# Patient Record
Sex: FEMALE | Race: WHITE | NOT HISPANIC OR LATINO
[De-identification: names, ages, dates, MRNs, and addresses within clinical notes are randomized per-mention and may not be internally consistent; named-entity substitution may affect disease eponyms.]

---

## 2019-07-29 PROBLEM — Z00.00 ENCOUNTER FOR PREVENTIVE HEALTH EXAMINATION: Status: ACTIVE | Noted: 2019-07-29

## 2019-08-05 ENCOUNTER — APPOINTMENT (OUTPATIENT)
Dept: ORTHOPEDIC SURGERY | Facility: CLINIC | Age: 44
End: 2019-08-05

## 2019-08-06 ENCOUNTER — TRANSFERRED RECORDS (OUTPATIENT)
Dept: HEALTH INFORMATION MANAGEMENT | Facility: CLINIC | Age: 44
End: 2019-08-06

## 2019-08-08 ENCOUNTER — APPOINTMENT (OUTPATIENT)
Dept: ORTHOPEDIC SURGERY | Facility: CLINIC | Age: 44
End: 2019-08-08
Payer: COMMERCIAL

## 2019-08-08 VITALS — WEIGHT: 129 LBS | HEIGHT: 68 IN | BODY MASS INDEX: 19.55 KG/M2

## 2019-08-08 DIAGNOSIS — Z82.49 FAMILY HISTORY OF ISCHEMIC HEART DISEASE AND OTHER DISEASES OF THE CIRCULATORY SYSTEM: ICD-10-CM

## 2019-08-08 DIAGNOSIS — Z87.09 PERSONAL HISTORY OF OTHER DISEASES OF THE RESPIRATORY SYSTEM: ICD-10-CM

## 2019-08-08 DIAGNOSIS — S73.191A OTHER SPRAIN OF RIGHT HIP, INITIAL ENCOUNTER: ICD-10-CM

## 2019-08-08 DIAGNOSIS — M76.821 POSTERIOR TIBIAL TENDINITIS, RIGHT LEG: ICD-10-CM

## 2019-08-08 DIAGNOSIS — Z85.9 PERSONAL HISTORY OF MALIGNANT NEOPLASM, UNSPECIFIED: ICD-10-CM

## 2019-08-08 PROCEDURE — 99204 OFFICE O/P NEW MOD 45 MIN: CPT

## 2020-06-04 ENCOUNTER — TRANSFERRED RECORDS (OUTPATIENT)
Dept: HEALTH INFORMATION MANAGEMENT | Facility: CLINIC | Age: 45
End: 2020-06-04

## 2020-06-07 ENCOUNTER — TRANSFERRED RECORDS (OUTPATIENT)
Dept: HEALTH INFORMATION MANAGEMENT | Facility: CLINIC | Age: 45
End: 2020-06-07

## 2020-06-09 ENCOUNTER — TRANSFERRED RECORDS (OUTPATIENT)
Dept: HEALTH INFORMATION MANAGEMENT | Facility: CLINIC | Age: 45
End: 2020-06-09

## 2020-11-19 ENCOUNTER — TRANSCRIBE ORDERS (OUTPATIENT)
Dept: OTHER | Age: 45
End: 2020-11-19

## 2020-11-19 DIAGNOSIS — C53.9 CERVICAL CANCER (H): Primary | ICD-10-CM

## 2020-11-19 DIAGNOSIS — Z90.711 HISTORY OF PARTIAL HYSTERECTOMY: ICD-10-CM

## 2020-11-24 NOTE — TELEPHONE ENCOUNTER
Action    Action Taken 11/24/20: LVM for pt re: recs call.  1:39 PM    -12/14/20: Spoke w. Pt - 2010, Pt was diagnosed @ [facility]. Provided me w/ verbal auth. Pt advised has been w/ [facility] since beginning. Pt could not recall when they may have had imaging done, mentioned no PETs specifically. Pt advised me all recs @ [facility], gave verbal permission to view recs in CE.     [facility]: [id number]    -12/14/20: Spoke w/ [facility] Medical Records - they will not release us additional information until pt is in facility, and would not provide me with a fax number. I will CB Friday, and state pt is in facility.  12:29 PM    -12/15/20: Rec'd call from Francine @ [facility] Path - they need signed MIKE for Path/Reports & advised me any studies older than 2018 would be stored off site & take longer to retrieve.     -Roxana advised me that she printed up all path reports & is ready to send them once MIKE is rec'd    -Spoke w/ Pt - emailed MIKE to pt.  11:32 AM    12/17/20: MIKE rec'd - sent to HIM for urgent upload, re-faxed shipping label, request & included MIKE to [facility] Pathology Department.    12/18/20: Path Reports rec'd - faxed to HIM for urgent upload  8:00 AM           RECORDS STATUS - ALL OTHER DIAGNOSIS      RECORDS RECEIVED FROM: [facility]   DATE RECEIVED:    NOTES STATUS DETAILS   OFFICE NOTE from referring provider Self, notes in CE/[facility]    OFFICE NOTE from medical oncologist [facility]/ - [facility] Dr. Ruben Painting: 6/4/20   DISCHARGE SUMMARY from hospital     DISCHARGE REPORT from the ER     OPERATIVE REPORT  - [facility] 11/18/16: Modified laparoscopic radical hysterectomy, bilateral salpingectomy, bilateral ureterolysis, bilateral sentinel lymph node biopsies.       10/2016: Examination under anesthesia, Pap smear, endocervical curettage procedure.    5/8/15: Examination under anesthesia/Endocervical curettage    9/12/14  7/18/2014  10/2009  5/8/2009   MEDICATION LIST     CLINICAL  TRIAL TREATMENTS TO DATE     LABS     PATHOLOGY REPORTS     ANYTHING RELATED TO DIAGNOSIS AMADA Mariano 11/5/19: PAP   GENONOMIC TESTING     TYPE:     IMAGING (NEED IMAGES & REPORT)     CT SCANS     MRI     MAMMO     ULTRASOUND     PET

## 2020-12-21 ENCOUNTER — ONCOLOGY VISIT (OUTPATIENT)
Dept: ONCOLOGY | Facility: CLINIC | Age: 45
End: 2020-12-21
Attending: OBSTETRICS & GYNECOLOGY
Payer: COMMERCIAL

## 2020-12-21 ENCOUNTER — PRE VISIT (OUTPATIENT)
Dept: ONCOLOGY | Facility: CLINIC | Age: 45
End: 2020-12-21

## 2020-12-21 VITALS
TEMPERATURE: 97.8 F | OXYGEN SATURATION: 99 % | HEART RATE: 76 BPM | HEIGHT: 67 IN | WEIGHT: 126 LBS | BODY MASS INDEX: 19.78 KG/M2 | SYSTOLIC BLOOD PRESSURE: 100 MMHG | DIASTOLIC BLOOD PRESSURE: 66 MMHG

## 2020-12-21 DIAGNOSIS — C53.9 CERVICAL CANCER (H): Primary | ICD-10-CM

## 2020-12-21 PROCEDURE — 88175 CYTOPATH C/V AUTO FLUID REDO: CPT | Mod: TC | Performed by: OBSTETRICS & GYNECOLOGY

## 2020-12-21 PROCEDURE — G0463 HOSPITAL OUTPT CLINIC VISIT: HCPCS

## 2020-12-21 PROCEDURE — 99204 OFFICE O/P NEW MOD 45 MIN: CPT | Performed by: OBSTETRICS & GYNECOLOGY

## 2020-12-21 PROCEDURE — 87624 HPV HI-RISK TYP POOLED RSLT: CPT | Performed by: OBSTETRICS & GYNECOLOGY

## 2020-12-21 ASSESSMENT — PAIN SCALES - GENERAL: PAINLEVEL: NO PAIN (0)

## 2020-12-21 ASSESSMENT — MIFFLIN-ST. JEOR: SCORE: 1254.28

## 2020-12-21 NOTE — LETTER
Date:December 22, 2020      Patient was self referred, no letter generated. Do not send.        [facility] Health Information

## 2020-12-21 NOTE — PROGRESS NOTES
Consult Notes on Referred Patient    Date: 2020       Dr. Malena Bell MD  No address on file       RE: Jaelyn Jeffries  : 1975  NIKOLAS: 2020    Dear Dr. Referred Self:    I had the pleasure of seeing your patient Jaelyn Jeffries here at the Gynecologic Cancer Clinic at the [facility] on 2020.  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 Given these findings she was subsequently sent to the Gynecologic Cancer Clinic for new patient consultation to establish care.       Today: Just moved from [facility], has one year old boy-Tobias born from [facility] [place] [place]. Living in [place]. Working remotely here from [facility]. Was getting every 6 mos pap smears in [facility]. Here today for repeat pap. Feeling well overall except has a nodule in right axilla she would like me to look at.    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    2018: PAP WNL:  SPECIMEN ADEQUACY:   Satisfactory for evaluation.     DIAGNOSTIC INTERPRETATION:   Negative for intraepithelial lesions and malignancy     PRIOR PAP SMEAR DIAGNOSES:   DATE ACCESSION #    DIAGNOSIS   17  [id number]    Negative   10/17/16  [id number]    LGSIL   16  [facility] [id number]    Negative   10/07/15  [facility] [place]15-08707    JATINDER   04/15/15  -[id number]    Negative   01/07/15  -[id number]    Negative   14  [facility] [place]14-71605    ASCUS sugg, r/o KEITH   14  [id number]    Carcinoma   10/31/13  -[id number]    Negative   13  -[id number]    Negative   10/24/12  [facility] [id number]    JATINDER   12  [facility] [place]12-31843    JATINDER   12  [facility] [place]12-97757    Negative   11   -[id number]    Negative   02/02/11  [facility]11-11988    ASCUS   09/08/10  [id number]    Negative   05/20/10  [id number]    Negative   04/14/10  [id number]    JATINDER   01/14/10  [id number]    Negative   09/09/09  [id number]    Negative     Patient presents today c/o right Bartholin abscess     11/07/2018: S/p Bartholyn cyst marcupalization  DIAGNOSIS:  Bartholin's gland cyst wall, excision:  - Bartholin's gland cyst wall with severe acute inflammation and  reactive changes.    1/24/2019: PAP unsatisfactory for evaluation     2/12/2019: S/p rpt PAP WNL/HRHPV pos:  SPECIMEN ADEQUACY:   Satisfactory for evaluation.     DIAGNOSTIC INTERPRETATION:   Negative for intraepithelial lesions and malignancy     HPV RESULTS:   Positive for HPV, other High Risk Genotype   (31/33/35/39/45/51/52/56/58/59/66/68).   Negative for HPV 16/18 Genotypes     8/1/2019: S/p PAP showing ASCUS/HRHPV pos (HPV 16/18 neg):  DIAGNOSIS: CommentAbnormal Final [facility] 01   EPITHELIAL CELL ABNORMALITY.   ATYPICAL SQUAMOUS CELLS OF UNDETERMINED SIGNIFICANCE (ASC-US)   (VAGINAL).     HPV OTHER HR TYPES PositiveAbnormal Negative Final [facility] 02   HPV 16 Negative Negative Final [facility] 02   HPV 18 Negative Negative Final [facility] 02     8/06/2019: S/p colposcopy with vaginal cuff biopsy WNL:  DIAGNOSIS:     VAGINAL CUFF BIOPSY  -  Parakeratosis and occasional cells   suggestive of koilocytes.     11/05/2019: PAP WNL/HPV HR neg    6/9/2020: pap-NIL/HPV HR negative    Review of Systems:    Systemic           no weight changes; no fever; no chills; no night sweats; no appetite changes  Skin           no rashes, or lesions  Eye           no irritation; no changes in vision  Lila-Laryngeal           no dysphagia; no hoarseness   Pulmonary    no cough; no shortness of breath  Cardiovascular    no chest pain; no palpitations  Gastrointestinal    no diarrhea; no constipation; no abdominal pain; no changes in bowel  habits; no blood in stool  Genitourinary   no  urinary frequency; no urinary urgency; no dysuria; no pain; no abnormal vaginal discharge; no abnormal vaginal bleeding  Breast   no breast discharge; no breast changes; no breast pain  Musculoskeletal    no myalgias; no arthralgias; no back pain  Psychiatric           no depressed mood; no anxiety    Hematologic           no tender lymph nodes; no noticeable swellings or lumps   Endocrine    no hot flashes; no heat/cold intolerance         Neurological   no tremor; no numbness and tingling; no headaches; no difficulty  sleeping      Past Medical History:     Carcinoma in situ of endocervix     Fungal infection     Constipation, chronic     Fibrocystic breast changes     Abnormal Pap smear     Depression     Anxiety     Neck pain on right side     Pain of right scapula     Labral tear of shoulder     Cervicalgia     Adenocarcinoma in situ (AIS) of uterine cervix     Overactive bladder     Cyst of Bartholin's gland     Bartholin gland cyst   Labral tear of hip, degenerative     Laceration of hip, right 2012   labial tear       Past Surgical History:     CONIZATION CERVIX,KNIFE/LASER 5/2010, 10/2010   x2     HX EXTERNAL EAR SURGERY age 5     HX NASAL SEPTUM SURGERY 2007   with rhinoplasty     ORAL SURGERY PROCEDURE     TOTAL HYSTERECTOMY 11/18/16   radical, salpingectomy B/L, Bartholin cyst marsupalization       Health Maintenance:  Health Maintenance Due   Topic Date Due     PREVENTIVE CARE VISIT  1975     HIV SCREENING  07/25/1990     HEPATITIS C SCREENING  07/25/1993     DTAP/TDAP/TD IMMUNIZATION (1 - Tdap) 07/25/2000     PAP  01/09/2018     PHQ-2  01/01/2020     LIPID  07/25/2020       Last Pap Smear: 6/9/2020             Result: normal neg HR HPV  She has had a history of abnormal Pap smears.    Last Mammogram:  due 1/2021             Result: normal      She has not had a history of abnormal mammograms.    Last Colonoscopy: None              Result: not done                Patient has 1 year old child born  "via surrogate    Current Medications:     currently has no medications in their medication list.       Allergies:     No Known Allergies         Social History:     Social History     Tobacco Use     Smoking status: Not on file   Substance Use Topics     Alcohol use: Not on file       History   Drug Use Not on file           Family History:     The patient's family history is notable for the following:    Father's mother with ovarian cancer-  Patient was tested for BRCA negative at [facility]      Physical Exam:     /66   Pulse 76   Temp 97.8  F (36.6  C) (Tympanic)   Ht 1.71 m (5' 7.32\")   Wt 57.2 kg (126 lb)   SpO2 99%   BMI 19.55 kg/m    Body mass index is 19.55 kg/m .    General Appearance: healthy and alert, no distress     HEENT:  no thyromegaly, no palpable nodules or masses        Cardiovascular: regular rate and rhythm, no gallops, rubs or murmurs     Respiratory: lungs clear, no rales, rhonchi or wheezes, normal diaphragmatic excursion    Musculoskeletal: extremities non tender and without edema    Skin: Right axilla, raised firm bump. With pressure I am able to express whitish discharge from this until area is decompressed.     Neurological: normal gait, no gross defects     Psychiatric: appropriate mood and affect                               Hematological: normal cervical, supraclavicular and inguinal lymph nodes     Gastrointestinal:       abdomen soft, non-tender, non-distended, no organomegaly or masses    Genitourinary: External genitalia and urethral meatus appears normal.  Vagina is smooth without nodularity or masses.  Cervix absent, mild scar at apex.  Bimanual exam reveal no masses, nodularity or fullness.  Recto-vaginal exam confirms these findings. Pap done of vaginal apex.      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  S/p Bartholin cyst marsupulization   Right axillary sebaceous cyst        Plan:     1.)    Pap done today. 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 Plan return in 6 mos for exam with NP, repeat pap in 1 year if normal today. Following this can do yearly visits as will be out 5 years in 11/2021.     2.) Genetic risk factors were assessed - patient was tested and negative for BRCA due to [employer] heritage.    3.) Labs and/or tests ordered include: pap smear     4.) Health maintenance issues addressed today include: patient to have MMG in Jan 2021-already scheduled.    5.) Right axillary sebaceous cyst-recommend heat to area.           Thank you for allowing us to participate in the care of your patient.         Sincerely,    Kenna Roblero MD    Department of Ob/Gyn and Women's Health  Division of Gynecologic Oncology  [facility]  [phone]          CC  No care team member to display  SELF, REFERRED

## 2020-12-21 NOTE — NURSING NOTE
Return appt 6 months with NP   Return 1 year with regular gyn  Pap smear done today, orders entered, specimen sent to cytology

## 2020-12-21 NOTE — PATIENT INSTRUCTIONS
Follow up appointment in 6 months, scheduling will call you to help make an appointment  referral to gynecology(Mark Rosenthal, Kee), scheduling will call you to help make an appointment  Pap smear done today, you will be notified via my chart/telephone with test results

## 2020-12-24 LAB
COPATH REPORT: NORMAL
PAP: NORMAL

## 2020-12-28 LAB
FINAL DIAGNOSIS: NORMAL
HPV HR 12 DNA CVX QL NAA+PROBE: NEGATIVE
HPV16 DNA SPEC QL NAA+PROBE: NEGATIVE
HPV18 DNA SPEC QL NAA+PROBE: NEGATIVE
SPECIMEN DESCRIPTION: NORMAL
SPECIMEN SOURCE CVX/VAG CYTO: NORMAL

## 2021-01-06 ENCOUNTER — DOCUMENTATION ONLY (OUTPATIENT)
Dept: ONCOLOGY | Facility: CLINIC | Age: 46
End: 2021-01-06

## 2021-01-06 NOTE — PROGRESS NOTES
Slides from [facility] rec'd - sent to 5th floor lab @ [facility]  3:32 PM

## 2021-01-08 PROCEDURE — 999N001032 HC STATISTIC REVIEW OUTSIDE SLIDES TC 88321: Performed by: OBSTETRICS & GYNECOLOGY

## 2021-01-08 PROCEDURE — 88321 CONSLTJ&REPRT SLD PREP ELSWR: CPT | Mod: GC | Performed by: PATHOLOGY

## 2021-01-13 LAB — COPATH REPORT: NORMAL

## 2021-01-15 ENCOUNTER — HEALTH MAINTENANCE LETTER (OUTPATIENT)
Age: 46
End: 2021-01-15

## 2021-01-27 ENCOUNTER — PATIENT OUTREACH (OUTPATIENT)
Dept: ONCOLOGY | Facility: CLINIC | Age: 46
End: 2021-01-27

## 2021-01-28 NOTE — PROGRESS NOTES
Pt left message on voice mail  Looking for pap test results and wonders if should get covid vaccine

## 2021-01-28 NOTE — PROGRESS NOTES
Pt informed on test results  Wonders if she should get covid test  Discussed parameters of the need for covid testing

## 2021-03-07 ENCOUNTER — HEALTH MAINTENANCE LETTER (OUTPATIENT)
Age: 46
End: 2021-03-07

## 2021-06-08 PROBLEM — C53.9 CERVICAL ADENOCARCINOMA (H): Status: ACTIVE | Noted: 2021-06-08

## 2021-06-08 ASSESSMENT — ENCOUNTER SYMPTOMS
ORTHOPNEA: 0
LOSS OF CONSCIOUSNESS: 0
EYE WATERING: 0
LEG SWELLING: 0
CHILLS: 0
SINUS PAIN: 0
BREAST PAIN: 0
NAUSEA: 0
SNORES LOUDLY: 0
EYE REDNESS: 0
HALLUCINATIONS: 0
DECREASED APPETITE: 0
BLOOD IN STOOL: 0
COUGH DISTURBING SLEEP: 0
RECTAL PAIN: 0
MUSCLE CRAMPS: 0
EYE PAIN: 0
DECREASED LIBIDO: 0
WEIGHT LOSS: 0
HOARSE VOICE: 0
SLEEP DISTURBANCES DUE TO BREATHING: 0
WEIGHT GAIN: 0
SKIN CHANGES: 0
CONSTIPATION: 0
HEADACHES: 0
DIZZINESS: 0
MUSCLE WEAKNESS: 0
SYNCOPE: 0
CLAUDICATION: 0
BRUISES/BLEEDS EASILY: 0
ARTHRALGIAS: 0
SMELL DISTURBANCE: 0
ABDOMINAL PAIN: 0
HEMATURIA: 0
HYPERTENSION: 0
FLANK PAIN: 0
NERVOUS/ANXIOUS: 0
LEG PAIN: 0
NECK MASS: 0
HEMOPTYSIS: 0
FEVER: 0
SWOLLEN GLANDS: 0
DECREASED CONCENTRATION: 0
BOWEL INCONTINENCE: 0
NUMBNESS: 0
VOMITING: 0
POLYPHAGIA: 0
LIGHT-HEADEDNESS: 0
HYPOTENSION: 0
TACHYCARDIA: 0
MEMORY LOSS: 0
POLYDIPSIA: 0
WHEEZING: 0
DISTURBANCES IN COORDINATION: 0
EYE IRRITATION: 0
NECK PAIN: 0
JAUNDICE: 0
RESPIRATORY PAIN: 0
DIARRHEA: 0
SEIZURES: 0
DYSPNEA ON EXERTION: 0
NIGHT SWEATS: 0
DEPRESSION: 0
ALTERED TEMPERATURE REGULATION: 0
SPUTUM PRODUCTION: 0
PARALYSIS: 0
FATIGUE: 0
JOINT SWELLING: 0
SPEECH CHANGE: 0
WEAKNESS: 0
HEARTBURN: 0
TASTE DISTURBANCE: 0
STIFFNESS: 0
HOT FLASHES: 0
BACK PAIN: 0
COUGH: 0
TINGLING: 0
PANIC: 0
SHORTNESS OF BREATH: 0
RECTAL BLEEDING: 0
TREMORS: 0
DYSURIA: 0
INSOMNIA: 0
PALPITATIONS: 0
DOUBLE VISION: 0
INCREASED ENERGY: 0
SINUS CONGESTION: 0
SORE THROAT: 0
DIFFICULTY URINATING: 0
TROUBLE SWALLOWING: 0
POOR WOUND HEALING: 0
EXTREMITY NUMBNESS: 0
MYALGIAS: 0
BREAST MASS: 0
EXERCISE INTOLERANCE: 0
BLOATING: 0
NAIL CHANGES: 0
POSTURAL DYSPNEA: 0

## 2021-06-08 NOTE — PROGRESS NOTES
Gynecologic Oncology Follow Up Note    Date: 2021    RE: Jaelyn Jeffries  : 1975  NIKOLAS: 2021    CC: Stage IA2 adenocarcinoma of the cervix    HPI:  Jaelyn Jeffries is a 45 year old woman with a history of stage IA2 adenocarcinoma of the cervix.  She is s/p modified radical hysterectomy with sentinel lymph node dissection 2016, which served as her treatment.  She is here today for a surveillance visit.     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    2018: PAP WNL:  SPECIMEN ADEQUACY:   Satisfactory for evaluation.     DIAGNOSTIC INTERPRETATION:   Negative for intraepithelial lesions and malignancy     PRIOR PAP SMEAR DIAGNOSES:   DATE ACCESSION #    DIAGNOSIS   17  [id number]    Negative   10/17/16  [id number]    LGSIL   16  -[id number]    Negative   10/07/15  -[id number]    JATINDER   04/15/15  -[id number]    Negative   01/07/15  -[id number]    Negative   14  -[id number]    ASCUS sugg, r/o KEITH   14  -[id number]    Carcinoma   10/31/13  -[id number]    Negative   13  -[id number]    Negative   10/24/12  -[id number]    JATINDER   12  -[id number]    JATINDER   12  -[id number]    Negative   11  -[id number]    Negative   11  -[id number]    ASCUS   09/08/10  -[id number]    Negative   05/20/10  -[id number]    Negative   04/14/10  -[id number]    JATINDER   01/14/10  -[id number]    Negative   09  -[id number]    Negative     Patient presents today c/o right Bartholin abscess     2018: S/p Bartholyn cyst marcupalization  DIAGNOSIS:  Bartholin's gland cyst wall, excision:  - Bartholin's gland cyst wall with severe acute inflammation and  reactive  changes.    1/24/2019: PAP unsatisfactory for evaluation     2/12/2019: S/p rpt PAP WNL/HRHPV pos:  SPECIMEN ADEQUACY:   Satisfactory for evaluation.     DIAGNOSTIC INTERPRETATION:   Negative for intraepithelial lesions and malignancy     HPV RESULTS:   Positive for HPV, other High Risk Genotype   (31/33/35/39/45/51/52/56/58/59/66/68).   Negative for HPV 16/18 Genotypes     8/1/2019: S/p PAP showing ASCUS/HRHPV pos (HPV 16/18 neg):  DIAGNOSIS: CommentAbnormal Final [facility] 01   EPITHELIAL CELL ABNORMALITY.   ATYPICAL SQUAMOUS CELLS OF UNDETERMINED SIGNIFICANCE (ASC-US)   (VAGINAL).     HPV OTHER HR TYPES PositiveAbnormal Negative Final [facility] 02   HPV 16 Negative Negative Final [facility] 02   HPV 18 Negative Negative Final [facility] 02     8/06/2019: S/p colposcopy with vaginal cuff biopsy WNL:  DIAGNOSIS:     VAGINAL CUFF BIOPSY  -  Parakeratosis and occasional cells   suggestive of koilocytes.     11/05/2019: PAP WNL/HPV HR neg  6/9/2020: pap-NIL/HPV HR negative  12/21/2020: Pap NIL.  HPV neg                  Today she reports feeling well and is without concern.  She denies any vaginal bleeding, no changes in her bowel or bladder habits, no nausea/emesis, no lower extremity edema, and no difficulties eating or sleeping. She denies any abdominal discomfort/bloating, no fevers or chills, and no chest pain or shortness of breath. She is current with her annual physical, mammogram, and she is fully vaccinated against COVID.                    Health Maintenance  Mammogram: 1/25/21  Annual physical: 2/15/21  COVID vaccine: 3/15/21, 4/9/21      Review of Systems:    Review of Systems     Constitutional:  Negative for fever, chills, weight loss, weight gain, fatigue, decreased appetite, night sweats, recent stressors, height gain, height loss, post-operative complications, incisional pain, hallucinations, increased energy, hyperactivity and confused.   HENT:  Negative for ear pain, hearing loss, tinnitus, nosebleeds,  trouble swallowing, hoarse voice, mouth sores, sore throat, ear discharge, tooth pain, gum tenderness, taste disturbance, smell disturbance, hearing aid, bleeding gums, dry mouth, sinus pain, sinus congestion and neck mass.    Eyes:  Negative for double vision, pain, redness, eye pain, decreased vision, eye watering, eye bulging, eye dryness, flashing lights, spots, floaters, strabismus, tunnel vision, jaundice and eye irritation.   Respiratory:   Negative for cough, hemoptysis, sputum production, shortness of breath, wheezing, sleep disturbances due to breathing, snores loudly, respiratory pain, dyspnea on exertion, cough disturbing sleep and postural dyspnea.    Cardiovascular:  Negative for chest pain, dyspnea on exertion, palpitations, orthopnea, claudication, leg swelling, fingers/toes turn blue, hypertension, hypotension, syncope, history of heart murmur, chest pain on exertion, chest pain at rest, pacemaker, few scattered varicosities, leg pain, sleep disturbances due to breathing, tachycardia, light-headedness, exercise intolerance and edema.   Gastrointestinal:  Negative for heartburn, nausea, vomiting, abdominal pain, diarrhea, constipation, blood in stool, melena, rectal pain, bloating, hemorrhoids, bowel incontinence, jaundice, rectal bleeding, coffee ground emesis and change in stool.   Genitourinary:  Negative for bladder incontinence, dysuria, urgency, hematuria, flank pain, vaginal discharge, difficulty urinating, genital sores, dyspareunia, decreased libido, nocturia, voiding less frequently, arousal difficulty, abnormal vaginal bleeding, excessive menstruation, menstrual changes, hot flashes, vaginal dryness and postmenopausal bleeding.   Musculoskeletal:  Negative for myalgias, back pain, joint swelling, arthralgias, stiffness, muscle cramps, neck pain, bone pain, muscle weakness and fracture.   Skin:  Negative for nail changes, itching, poor wound healing, rash, hair changes, skin changes, acne,  warts, poor wound healing, scarring, flaky skin, Raynaud's phenomenon, sensitivity to sunlight and skin thickening.   Neurological:  Negative for dizziness, tingling, tremors, speech change, seizures, loss of consciousness, weakness, light-headedness, numbness, headaches, disturbances in coordination, extremity numbness, memory loss, difficulty walking and paralysis.   Endo/Heme:  Negative for anemia, swollen glands and bruises/bleeds easily.   Psychiatric/Behavioral:  Negative for depression, hallucinations, memory loss, decreased concentration, mood swings and panic attacks.    Breast:  Negative for breast discharge, breast mass, breast pain and nipple retraction.   Endocrine:  Negative for altered temperature regulation, polyphagia, polydipsia, unwanted hair growth and change in facial hair.        Past Medical History:    No past medical history on file.      Past Surgical History:    No past surgical history on file.      Health Maintenance Due   Topic Date Due     PREVENTIVE CARE VISIT  Never done     ADVANCE CARE PLANNING  Never done     MAMMO SCREENING  Never done     HIV SCREENING  Never done     HEPATITIS C SCREENING  Never done     DTAP/TDAP/TD IMMUNIZATION (1 - Tdap) Never done     LIPID  Never done     PHQ-2  Never done       Current Medications:     No current outpatient medications on file.         Allergies:      No Known Allergies     Social History:     Social History     Tobacco Use     Smoking status: Never Smoker     Smokeless tobacco: Never Used   Substance Use Topics     Alcohol use: Not on file       History   Drug Use Not on file         Family History:     The patient's family history is notable for:    No family history on file.      Physical Exam:     /70   Pulse 78   Temp 97.7  F (36.5  C) (Tympanic)   Resp 16   Wt 59.1 kg (130 lb 3.2 oz)   SpO2 100%   BMI 20.20 kg/m    Body mass index is 20.2 kg/m .    General Appearance: healthy and alert, no distress     HEENT: no palpable  nodules or masses        Cardiovascular: regular rate and rhythm, no gallops, rubs or murmurs     Respiratory: lungs clear, no rales, rhonchi or wheezes    Musculoskeletal: extremities non tender and without edema    Skin: no lesions or rashes     Neurological: normal gait, no gross defects     Psychiatric: appropriate mood and affect                               Hematological: normal cervical, supraclavicular and inguinal lymph nodes     Gastrointestinal:       abdomen soft, non-tender, non-distended, no organomegaly or masses    Genitourinary: External genitalia and urethral meatus appears normal.  Vagina is smooth without nodularity or masses.  Cervix is surgically absent.  Bimanual exam reveal no masses, nodularity or fullness.  Recto-vaginal exam confirms these findings.      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  She is s/p modified radical hysterectomy with sentinel lymph node dissection 11/2016, which served as her treatment.  She is here today for a surveillance visit.        12 minutes spent on the date of the encounter doing chart review, history and exam, documentation, and further activities as noted above.      Plan:     1.) Cervical adenocarcinoma:  PATRICK on exam.  RTC in 6 months for her next surveillance visit and pap.  At this point she will be 5 years post treatment and can begin extending her visits to yearly.  These can be done here or with her PCP if they are comfortable.  At this point she is inclined to continue with the clinic here.  Reviewed signs and symptoms for when she should contact the clinic or seek additional care.  Patient to contact the clinic with any questions or concerns in the interim.        Genetic risk factors were assessed and she does not meet qualification for referral.      Labs and/or tests ordered include:  None.     2.) Health maintenance:  Issues addressed today include following up with PCP  for annual health maintenance and non-gynecologic issues.       April García, DNP, APRN, FNP-C  Nurse Practitioner  Division of Gynecologic Oncology  Pager: [phone]     CC  Patient Care Team:  Clarke Lopez MD as PCP - General (Family Medicine)  Felipe Solomon MD as Assigned Cancer Care Provider  FELIPE SOLOMON

## 2021-06-22 ENCOUNTER — ONCOLOGY VISIT (OUTPATIENT)
Dept: ONCOLOGY | Facility: CLINIC | Age: 46
End: 2021-06-22
Attending: OBSTETRICS & GYNECOLOGY
Payer: COMMERCIAL

## 2021-06-22 VITALS
BODY MASS INDEX: 20.2 KG/M2 | OXYGEN SATURATION: 100 % | SYSTOLIC BLOOD PRESSURE: 109 MMHG | TEMPERATURE: 97.7 F | HEART RATE: 78 BPM | DIASTOLIC BLOOD PRESSURE: 70 MMHG | WEIGHT: 130.2 LBS | RESPIRATION RATE: 16 BRPM

## 2021-06-22 DIAGNOSIS — Z08 ENCOUNTER FOR FOLLOW-UP SURVEILLANCE OF CERVICAL CANCER: Primary | ICD-10-CM

## 2021-06-22 DIAGNOSIS — Z85.41 ENCOUNTER FOR FOLLOW-UP SURVEILLANCE OF CERVICAL CANCER: Primary | ICD-10-CM

## 2021-06-22 DIAGNOSIS — C53.9 CERVICAL ADENOCARCINOMA (H): ICD-10-CM

## 2021-06-22 PROCEDURE — 99212 OFFICE O/P EST SF 10 MIN: CPT | Performed by: NURSE PRACTITIONER

## 2021-06-22 PROCEDURE — G0463 HOSPITAL OUTPT CLINIC VISIT: HCPCS

## 2021-06-22 ASSESSMENT — PAIN SCALES - GENERAL: PAINLEVEL: NO PAIN (0)

## 2021-06-22 NOTE — NURSING NOTE
"Oncology Rooming Note    June 22, 2021 10:18 AM   Jaelyn Jeffries is a 45 year old female who presents for:    Chief Complaint   Patient presents with     Oncology Clinic Visit     6 MONTH FOLLOW UP:Cervical adenocarcinoma (H)      Initial Vitals: /70   Pulse 78   Temp 97.7  F (36.5  C) (Tympanic)   Resp 16   Wt 59.1 kg (130 lb 3.2 oz)   SpO2 100%   BMI 20.20 kg/m   Estimated body mass index is 20.2 kg/m  as calculated from the following:    Height as of 12/21/20: 1.71 m (5' 7.32\").    Weight as of this encounter: 59.1 kg (130 lb 3.2 oz). Body surface area is 1.68 meters squared.  No Pain (0) Comment: Data Unavailable   No LMP recorded.  Allergies reviewed: Yes  Medications reviewed: Yes    Medications: Medication refills not needed today.  Pharmacy name entered into EPIC: Data Unavailable    Clinical concerns: Patient has questions on menopause.       Evelyn Schafer MA            "

## 2021-06-22 NOTE — LETTER
2021         RE: Jaelyn Jeffries  [address] [address]        Dear Colleague,    Thank you for referring your patient, Jaelyn Jeffries, to the [facility]. Please see a copy of my visit note below.    Gynecologic Oncology Follow Up Note    Date: 2021    RE: Jaelyn Jeffries  : 1975  NIKOLAS: 2021    CC: Stage IA2 adenocarcinoma of the cervix    HPI:  Jaelyn Jeffries is a 45 year old woman with a history of stage IA2 adenocarcinoma of the cervix.  She is s/p modified radical hysterectomy with sentinel lymph node dissection 2016, which served as her treatment.  She is here today for a surveillance visit.     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    2018: PAP WNL:  SPECIMEN ADEQUACY:   Satisfactory for evaluation.     DIAGNOSTIC INTERPRETATION:   Negative for intraepithelial lesions and malignancy     PRIOR PAP SMEAR DIAGNOSES:   DATE ACCESSION #    DIAGNOSIS   17  [id number]    Negative   10/17/16  -[id number]    LGSIL   16  -[id number]    Negative   10/07/15  -[id number]    JATINDER   04/15/15  -[id number]    Negative   01/07/15  -[id number]    Negative   14  -[id number]    ASCUS sugg, r/o KEITH   14  [id number]    Carcinoma   10/31/13  -[id number]    Negative   13  -[id number]    Negative   10/24/12  -[id number]    JATINDER   12  -12-41163    JATINDER   12  -12-11393    Negative   11  -11-41141    Negative   11  -11-20743    ASCUS   09/08/10  -[id number]    Negative   05/20/10  [id number]    Negative   04/14/10  -[id number]    JATINDER   01/14/10  -[id number]    Negative   09   -[id number]    Negative     Patient presents today c/o right Bartholin abscess     11/07/2018: S/p Bartholyn cyst marcupalization  DIAGNOSIS:  Bartholin's gland cyst wall, excision:  - Bartholin's gland cyst wall with severe acute inflammation and  reactive changes.    1/24/2019: PAP unsatisfactory for evaluation     2/12/2019: S/p rpt PAP WNL/HRHPV pos:  SPECIMEN ADEQUACY:   Satisfactory for evaluation.     DIAGNOSTIC INTERPRETATION:   Negative for intraepithelial lesions and malignancy     HPV RESULTS:   Positive for HPV, other High Risk Genotype   (31/33/35/39/45/51/52/56/58/59/66/68).   Negative for HPV 16/18 Genotypes     8/1/2019: S/p PAP showing ASCUS/HRHPV pos (HPV 16/18 neg):  DIAGNOSIS: CommentAbnormal Final [facility] 01   EPITHELIAL CELL ABNORMALITY.   ATYPICAL SQUAMOUS CELLS OF UNDETERMINED SIGNIFICANCE (ASC-US)   (VAGINAL).     HPV OTHER HR TYPES PositiveAbnormal Negative Final [facility] 02   HPV 16 Negative Negative Final [facility] 02   HPV 18 Negative Negative Final [facility] 02     8/06/2019: S/p colposcopy with vaginal cuff biopsy WNL:  DIAGNOSIS:     VAGINAL CUFF BIOPSY  -  Parakeratosis and occasional cells   suggestive of koilocytes.     11/05/2019: PAP WNL/HPV HR neg  6/9/2020: pap-NIL/HPV HR negative  12/21/2020: Pap NIL.  HPV neg                  Today she reports feeling well and is without concern.  She denies any vaginal bleeding, no changes in her bowel or bladder habits, no nausea/emesis, no lower extremity edema, and no difficulties eating or sleeping. She denies any abdominal discomfort/bloating, no fevers or chills, and no chest pain or shortness of breath. She is current with her annual physical, mammogram, and she is fully vaccinated against COVID.                    Health Maintenance  Mammogram: 1/25/21  Annual physical: 2/15/21  COVID vaccine: 3/15/21, 4/9/21      Review of Systems:    Review of Systems     Constitutional:  Negative for fever, chills, weight loss, weight gain, fatigue,  decreased appetite, night sweats, recent stressors, height gain, height loss, post-operative complications, incisional pain, hallucinations, increased energy, hyperactivity and confused.   HENT:  Negative for ear pain, hearing loss, tinnitus, nosebleeds, trouble swallowing, hoarse voice, mouth sores, sore throat, ear discharge, tooth pain, gum tenderness, taste disturbance, smell disturbance, hearing aid, bleeding gums, dry mouth, sinus pain, sinus congestion and neck mass.    Eyes:  Negative for double vision, pain, redness, eye pain, decreased vision, eye watering, eye bulging, eye dryness, flashing lights, spots, floaters, strabismus, tunnel vision, jaundice and eye irritation.   Respiratory:   Negative for cough, hemoptysis, sputum production, shortness of breath, wheezing, sleep disturbances due to breathing, snores loudly, respiratory pain, dyspnea on exertion, cough disturbing sleep and postural dyspnea.    Cardiovascular:  Negative for chest pain, dyspnea on exertion, palpitations, orthopnea, claudication, leg swelling, fingers/toes turn blue, hypertension, hypotension, syncope, history of heart murmur, chest pain on exertion, chest pain at rest, pacemaker, few scattered varicosities, leg pain, sleep disturbances due to breathing, tachycardia, light-headedness, exercise intolerance and edema.   Gastrointestinal:  Negative for heartburn, nausea, vomiting, abdominal pain, diarrhea, constipation, blood in stool, melena, rectal pain, bloating, hemorrhoids, bowel incontinence, jaundice, rectal bleeding, coffee ground emesis and change in stool.   Genitourinary:  Negative for bladder incontinence, dysuria, urgency, hematuria, flank pain, vaginal discharge, difficulty urinating, genital sores, dyspareunia, decreased libido, nocturia, voiding less frequently, arousal difficulty, abnormal vaginal bleeding, excessive menstruation, menstrual changes, hot flashes, vaginal dryness and postmenopausal bleeding.    Musculoskeletal:  Negative for myalgias, back pain, joint swelling, arthralgias, stiffness, muscle cramps, neck pain, bone pain, muscle weakness and fracture.   Skin:  Negative for nail changes, itching, poor wound healing, rash, hair changes, skin changes, acne, warts, poor wound healing, scarring, flaky skin, Raynaud's phenomenon, sensitivity to sunlight and skin thickening.   Neurological:  Negative for dizziness, tingling, tremors, speech change, seizures, loss of consciousness, weakness, light-headedness, numbness, headaches, disturbances in coordination, extremity numbness, memory loss, difficulty walking and paralysis.   Endo/Heme:  Negative for anemia, swollen glands and bruises/bleeds easily.   Psychiatric/Behavioral:  Negative for depression, hallucinations, memory loss, decreased concentration, mood swings and panic attacks.    Breast:  Negative for breast discharge, breast mass, breast pain and nipple retraction.   Endocrine:  Negative for altered temperature regulation, polyphagia, polydipsia, unwanted hair growth and change in facial hair.        Past Medical History:    No past medical history on file.      Past Surgical History:    No past surgical history on file.      Health Maintenance Due   Topic Date Due     PREVENTIVE CARE VISIT  Never done     ADVANCE CARE PLANNING  Never done     MAMMO SCREENING  Never done     HIV SCREENING  Never done     HEPATITIS C SCREENING  Never done     DTAP/TDAP/TD IMMUNIZATION (1 - Tdap) Never done     LIPID  Never done     PHQ-2  Never done       Current Medications:     No current outpatient medications on file.         Allergies:      No Known Allergies     Social History:     Social History     Tobacco Use     Smoking status: Never Smoker     Smokeless tobacco: Never Used   Substance Use Topics     Alcohol use: Not on file       History   Drug Use Not on file         Family History:     The patient's family history is notable for:    No family history on  file.      Physical Exam:     /70   Pulse 78   Temp 97.7  F (36.5  C) (Tympanic)   Resp 16   Wt 59.1 kg (130 lb 3.2 oz)   SpO2 100%   BMI 20.20 kg/m    Body mass index is 20.2 kg/m .    General Appearance: healthy and alert, no distress     HEENT: no palpable nodules or masses        Cardiovascular: regular rate and rhythm, no gallops, rubs or murmurs     Respiratory: lungs clear, no rales, rhonchi or wheezes    Musculoskeletal: extremities non tender and without edema    Skin: no lesions or rashes     Neurological: normal gait, no gross defects     Psychiatric: appropriate mood and affect                               Hematological: normal cervical, supraclavicular and inguinal lymph nodes     Gastrointestinal:       abdomen soft, non-tender, non-distended, no organomegaly or masses    Genitourinary: External genitalia and urethral meatus appears normal.  Vagina is smooth without nodularity or masses.  Cervix is surgically absent.  Bimanual exam reveal no masses, nodularity or fullness.  Recto-vaginal exam confirms these findings.      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  She is s/p modified radical hysterectomy with sentinel lymph node dissection 11/2016, which served as her treatment.  She is here today for a surveillance visit.        12 minutes spent on the date of the encounter doing chart review, history and exam, documentation, and further activities as noted above.      Plan:     1.) Cervical adenocarcinoma:  PATRICK on exam.  RTC in 6 months for her next surveillance visit and pap.  At this point she will be 5 years post treatment and can begin extending her visits to yearly.  These can be done here or with her PCP if they are comfortable.  At this point she is inclined to continue with the clinic here.  Reviewed signs and symptoms for when she should contact the clinic or seek additional care.  Patient to contact the  clinic with any questions or concerns in the interim.        Genetic risk factors were assessed and she does not meet qualification for referral.      Labs and/or tests ordered include:  None.     2.) Health maintenance:  Issues addressed today include following up with PCP for annual health maintenance and non-gynecologic issues.       April García, DNP, APRN, FNP-C  Nurse Practitioner  Division of Gynecologic Oncology  Pager: [phone]     CC  Patient Care Team:  Clarke Lopez MD as PCP - General (Family Medicine)

## 2021-10-11 ENCOUNTER — HEALTH MAINTENANCE LETTER (OUTPATIENT)
Age: 46
End: 2021-10-11

## 2021-12-14 ASSESSMENT — ENCOUNTER SYMPTOMS
MUSCLE CRAMPS: 0
DIARRHEA: 0
CLAUDICATION: 0
MYALGIAS: 0
SLEEP DISTURBANCES DUE TO BREATHING: 0
ORTHOPNEA: 0
LIGHT-HEADEDNESS: 0
DYSURIA: 0
COUGH DISTURBING SLEEP: 0
RECTAL PAIN: 0
SORE THROAT: 0
COUGH: 0
WEAKNESS: 0
SMELL DISTURBANCE: 0
EYE REDNESS: 0
SPUTUM PRODUCTION: 0
EYE WATERING: 0
TROUBLE SWALLOWING: 0
TACHYCARDIA: 0
LEG PAIN: 0
EXERCISE INTOLERANCE: 0
ARTHRALGIAS: 0
DOUBLE VISION: 0
MUSCLE WEAKNESS: 0
DISTURBANCES IN COORDINATION: 0
LEG SWELLING: 0
DEPRESSION: 0
BLOATING: 0
WEIGHT LOSS: 0
POSTURAL DYSPNEA: 0
DIFFICULTY URINATING: 0
SKIN CHANGES: 0
DIZZINESS: 0
POLYDIPSIA: 0
NERVOUS/ANXIOUS: 0
HALLUCINATIONS: 0
TINGLING: 0
RECTAL BLEEDING: 0
RESPIRATORY PAIN: 0
NUMBNESS: 0
MEMORY LOSS: 0
WEIGHT GAIN: 0
HEMATURIA: 0
WHEEZING: 0
JOINT SWELLING: 0
NAIL CHANGES: 0
TASTE DISTURBANCE: 0
NECK MASS: 0
POLYPHAGIA: 0
JAUNDICE: 0
NECK PAIN: 0
EYE IRRITATION: 0
POOR WOUND HEALING: 0
SEIZURES: 0
EYE PAIN: 0
CONSTIPATION: 0
LOSS OF CONSCIOUSNESS: 0
HEARTBURN: 0
SPEECH CHANGE: 0
PARALYSIS: 0
VOMITING: 0
HYPERTENSION: 0
SNORES LOUDLY: 0
EXTREMITY NUMBNESS: 0
PANIC: 0
HYPOTENSION: 0
HOT FLASHES: 0
ABDOMINAL PAIN: 0
FATIGUE: 0
DECREASED LIBIDO: 0
DECREASED APPETITE: 0
FEVER: 0
SYNCOPE: 0
SWOLLEN GLANDS: 0
NIGHT SWEATS: 0
HOARSE VOICE: 0
SINUS PAIN: 0
FLANK PAIN: 0
BACK PAIN: 0
DECREASED CONCENTRATION: 0
CHILLS: 0
TREMORS: 0
DYSPNEA ON EXERTION: 0
INSOMNIA: 0
BOWEL INCONTINENCE: 0
BRUISES/BLEEDS EASILY: 0
SINUS CONGESTION: 0
BLOOD IN STOOL: 0
PALPITATIONS: 0
STIFFNESS: 0
INCREASED ENERGY: 0
HEMOPTYSIS: 0
SHORTNESS OF BREATH: 0
ALTERED TEMPERATURE REGULATION: 0
NAUSEA: 0
HEADACHES: 0
BREAST MASS: 0
BREAST PAIN: 0

## 2021-12-14 NOTE — PROGRESS NOTES
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  She is s/p modified radical hysterectomy with sentinel lymph node dissection 2016, which served as her treatment. She is here today for a surveillance visit.     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    2018: PAP WNL:  SPECIMEN ADEQUACY:   Satisfactory for evaluation.     DIAGNOSTIC INTERPRETATION:   Negative for intraepithelial lesions and malignancy     PRIOR PAP SMEAR DIAGNOSES:   DATE ACCESSION #    DIAGNOSIS   17  [id number]    [place]   [place]  [facility] [id number]    LGSIL   16  -[id number]    Negative   10/07/15  -[id number]    JATNIDER   04/15/15  -[id number]    Negative   01/07/15  -15-07542    Negative   14  -14-99261    ASCUS sugg, r/o KEITH   14  -[id number]    Carcinoma   10/31/13  -[id number]    Negative   13  -13-02130    Negative   10/24/12  -12-99010    JATINDER   12  -12-12449    JATINDER   12  -12-52057    Negative   11  -11-33869    Negative   11  -11-37368    ASCUS   09/08/10  -[id number]    Negative   05/20/10  -[id number]    Negative   04/14/10  -10-85080    JATINDER   01/14/10  -[id number]    Negative   09  -09-06653    Negative     Patient presents today c/o right Bartholin abscess     2018: S/p Bartholyn cyst marcupalization  DIAGNOSIS:  Bartholin's gland cyst wall, excision:  - Bartholin's gland cyst wall with severe acute inflammation and  reactive changes.    2019: PAP unsatisfactory for  evaluation     2/12/2019: S/p rpt PAP WNL/HRHPV pos:  SPECIMEN ADEQUACY:   Satisfactory for evaluation.     DIAGNOSTIC INTERPRETATION:   Negative for intraepithelial lesions and malignancy     HPV RESULTS:   Positive for HPV, other High Risk Genotype   (31/33/35/39/45/51/52/56/58/59/66/68).   Negative for HPV 16/18 Genotypes     8/1/2019: S/p PAP showing ASCUS/HRHPV pos (HPV 16/18 neg):  DIAGNOSIS: CommentAbnormal Final [facility] 01   EPITHELIAL CELL ABNORMALITY.   ATYPICAL SQUAMOUS CELLS OF UNDETERMINED SIGNIFICANCE (ASC-US)   (VAGINAL).     HPV OTHER HR TYPES PositiveAbnormal Negative Final [facility] 02   HPV 16 Negative Negative Final [facility] 02   HPV 18 Negative Negative Final [facility] 02     8/06/2019: S/p colposcopy with vaginal cuff biopsy WNL:  DIAGNOSIS:     VAGINAL CUFF BIOPSY  -  Parakeratosis and occasional cells   suggestive of koilocytes.     11/05/2019: PAP WNL/HPV HR neg  6/9/2020: pap-NIL/HPV HR negative  12/21/2020: Pap NIL.  HPV neg  12/16/2021 pap pending           Today she reports feeling well and is without concern. She does think that she might have a vaginal yeast infection. She denies any vaginal bleeding, no changes in her bowel or bladder habits, no nausea/emesis, no lower extremity edema, and no difficulties eating or sleeping. She denies any abdominal discomfort/bloating, no fevers or chills, and no chest pain or shortness of breath. She is current with her annual physical, mammogram, and she is fully vaccinated against COVID. Reports urinary incotinence about three months ago that resolved after a cold. She is interested in pelvic floor PT for prevention of pelvic floor weakness s/p hysterectomy.         Health Maintenance  Mammogram: 1/25/21  Annual physical: 2/15/21  COVID vaccine: 3/15/21, 4/9/21      Review of Systems:    Review of Systems     Constitutional:  Negative for fever, chills, weight loss, weight gain, fatigue, decreased appetite, night sweats, recent stressors, height gain,  height loss, post-operative complications, incisional pain, hallucinations, increased energy, hyperactivity and confused.   HENT:  Negative for ear pain, hearing loss, tinnitus, nosebleeds, trouble swallowing, hoarse voice, mouth sores, sore throat, ear discharge, tooth pain, gum tenderness, taste disturbance, smell disturbance, hearing aid, bleeding gums, dry mouth, sinus pain, sinus congestion and neck mass.    Eyes:  Negative for double vision, pain, redness, eye pain, decreased vision, eye watering, eye bulging, eye dryness, flashing lights, spots, floaters, strabismus, tunnel vision, jaundice and eye irritation.   Respiratory:   Negative for cough, hemoptysis, sputum production, shortness of breath, wheezing, sleep disturbances due to breathing, snores loudly, respiratory pain, dyspnea on exertion, cough disturbing sleep and postural dyspnea.    Cardiovascular:  Negative for chest pain, dyspnea on exertion, palpitations, orthopnea, claudication, leg swelling, fingers/toes turn blue, hypertension, hypotension, syncope, history of heart murmur, chest pain on exertion, chest pain at rest, pacemaker, few scattered varicosities, leg pain, sleep disturbances due to breathing, tachycardia, light-headedness, exercise intolerance and edema.   Gastrointestinal:  Negative for heartburn, nausea, vomiting, abdominal pain, diarrhea, constipation, blood in stool, melena, rectal pain, bloating, hemorrhoids, bowel incontinence, jaundice, rectal bleeding, coffee ground emesis and change in stool.   Genitourinary:  Negative for bladder incontinence, dysuria, urgency, hematuria, flank pain, vaginal discharge, difficulty urinating, genital sores, dyspareunia, decreased libido, nocturia, voiding less frequently, arousal difficulty, abnormal vaginal bleeding, excessive menstruation, menstrual changes, hot flashes, vaginal dryness and postmenopausal bleeding.   Musculoskeletal:  Negative for myalgias, back pain, joint swelling,  arthralgias, stiffness, muscle cramps, neck pain, bone pain, muscle weakness and fracture.   Skin:  Negative for nail changes, itching, poor wound healing, rash, hair changes, skin changes, acne, warts, poor wound healing, scarring, flaky skin, Raynaud's phenomenon, sensitivity to sunlight and skin thickening.   Neurological:  Negative for dizziness, tingling, tremors, speech change, seizures, loss of consciousness, weakness, light-headedness, numbness, headaches, disturbances in coordination, extremity numbness, memory loss, difficulty walking and paralysis.   Endo/Heme:  Negative for anemia, swollen glands and bruises/bleeds easily.   Psychiatric/Behavioral:  Negative for depression, hallucinations, memory loss, decreased concentration, mood swings and panic attacks.    Breast:  Negative for breast discharge, breast mass, breast pain and nipple retraction.   Endocrine:  Negative for altered temperature regulation, polyphagia, polydipsia, unwanted hair growth and change in facial hair.        Past Medical History:    No past medical history on file.      Past Surgical History:    No past surgical history on file.      Health Maintenance Due   Topic Date Due     PREVENTIVE CARE VISIT  Never done     ADVANCE CARE PLANNING  Never done     MAMMO SCREENING  Never done     HIV SCREENING  Never done     HEPATITIS C SCREENING  Never done     DTAP/TDAP/TD IMMUNIZATION (1 - Tdap) Never done     LIPID  Never done     PHQ-2  Never done       Current Medications:     No current outpatient medications on file.         Allergies:      No Known Allergies     Social History:     Social History     Tobacco Use     Smoking status: Never Smoker     Smokeless tobacco: Never Used   Substance Use Topics     Alcohol use: Not on file       History   Drug Use Not on file         Family History:     The patient's family history is notable for:    No family history on file.      Physical Exam:     /71   Pulse 76   Temp 99  F (37.2  C)  (Oral)   Resp 18   Wt 60.6 kg (133 lb 11.2 oz)   SpO2 95%   BMI 20.74 kg/m    Body mass index is 20.74 kg/m .    General Appearance: healthy and alert, no distress     HEENT: no palpable nodules or masses        Cardiovascular: regular rate and rhythm, no gallops, rubs or murmurs     Respiratory: lungs clear, no rales, rhonchi or wheezes    Musculoskeletal: extremities non tender and without edema    Skin: no lesions or rashes     Neurological: normal gait, no gross defects     Psychiatric: appropriate mood and affect                               Hematological: normal cervical, supraclavicular and inguinal lymph nodes     Gastrointestinal:       abdomen soft, non-tender, non-distended, no organomegaly or masses    Genitourinary: External genitalia and urethral meatus appears normal. Thick white discharge adherent to vagina walls. Vagina is smooth without nodularity or masses.  Cervix is surgically absent.  Bimanual exam reveal no masses, nodularity or fullness.  Recto-vaginal exam confirms these findings. Pap and wet prep collected.       Assessment:    Jaelyn Jeffries is a 46 year old woman with a history of stage IA2 adenocarcinoma of the cervix.  She is s/p modified radical hysterectomy with sentinel lymph node dissection 11/2016, which served as her treatment.  She is here today for a surveillance visit.      20 minutes spent on the date of the encounter doing chart review, history and exam, documentation, and further activities as noted above.      Plan:     1.) Cervical adenocarcinoma:  PATRICK on exam. At this point she will be 5 years post treatment and can begin extending her visits to yearly.  RTC in one year Ladi. These can be done here or with her PCP if they are comfortable.  At this point she is inclined to continue with the clinic here. Reviewed signs and symptoms for when she should contact the clinic or seek additional care. Patient to contact the clinic with any questions or concerns in the  interim.     2.)        Genetic risk factors were assessed and she does not meet qualification for referral.    3.)        Labs and/or tests ordered include:  Vaginal pap, wet prep.     4.) Health maintenance:  Issues addressed today include following up with PCP for annual health maintenance and non-gynecologic issues.     5.)        Pelvic floor weakness: refer to pelvic floor PT        ANDRE Stephens, NP-BC  Women's Health Nurse Practitioner  Division of Gynecologic Oncology  [facility]      CC  Patient Care Team:  Clarke Lopez MD as PCP - General (Family Medicine)  April García APRN CNP as Assigned Cancer Care Provider  FELIPE SOLOMON

## 2021-12-16 ENCOUNTER — ONCOLOGY VISIT (OUTPATIENT)
Dept: ONCOLOGY | Facility: CLINIC | Age: 46
End: 2021-12-16
Attending: NURSE PRACTITIONER
Payer: COMMERCIAL

## 2021-12-16 VITALS
OXYGEN SATURATION: 95 % | DIASTOLIC BLOOD PRESSURE: 71 MMHG | HEART RATE: 76 BPM | SYSTOLIC BLOOD PRESSURE: 110 MMHG | TEMPERATURE: 99 F | RESPIRATION RATE: 18 BRPM | BODY MASS INDEX: 20.74 KG/M2 | WEIGHT: 133.7 LBS

## 2021-12-16 DIAGNOSIS — N89.8 VAGINAL DISCHARGE: ICD-10-CM

## 2021-12-16 DIAGNOSIS — B37.31 YEAST INFECTION OF THE VAGINA: Primary | ICD-10-CM

## 2021-12-16 DIAGNOSIS — C53.9 CERVICAL ADENOCARCINOMA (H): ICD-10-CM

## 2021-12-16 DIAGNOSIS — N81.89 PELVIC FLOOR RELAXATION: ICD-10-CM

## 2021-12-16 LAB
CLUE CELLS: ABNORMAL
TRICHOMONAS, WET PREP: ABNORMAL
WBC'S/HIGH POWER FIELD, WET PREP: ABNORMAL
YEAST, WET PREP: ABNORMAL

## 2021-12-16 PROCEDURE — 99213 OFFICE O/P EST LOW 20 MIN: CPT | Performed by: OBSTETRICS & GYNECOLOGY

## 2021-12-16 PROCEDURE — G0463 HOSPITAL OUTPT CLINIC VISIT: HCPCS

## 2021-12-16 PROCEDURE — 87210 SMEAR WET MOUNT SALINE/INK: CPT | Performed by: OBSTETRICS & GYNECOLOGY

## 2021-12-16 PROCEDURE — 88175 CYTOPATH C/V AUTO FLUID REDO: CPT | Performed by: OBSTETRICS & GYNECOLOGY

## 2021-12-16 RX ORDER — FLUCONAZOLE 150 MG/1
150 TABLET ORAL ONCE
Qty: 1 TABLET | Refills: 0 | Status: SHIPPED | OUTPATIENT
Start: 2021-12-16 | End: 2021-12-16

## 2021-12-16 ASSESSMENT — PAIN SCALES - GENERAL: PAINLEVEL: NO PAIN (0)

## 2021-12-16 NOTE — NURSING NOTE
"Oncology Rooming Note    December 16, 2021 2:19 PM   Jaelyn Jeffries is a 46 year old female who presents for:    Chief Complaint   Patient presents with     Oncology Clinic Visit     Cervical adenocarcinoma      Initial Vitals: /71   Pulse 76   Temp 99  F (37.2  C) (Oral)   Resp 18   Wt 60.6 kg (133 lb 11.2 oz)   SpO2 95%   BMI 20.74 kg/m   Estimated body mass index is 20.74 kg/m  as calculated from the following:    Height as of 12/21/20: 1.71 m (5' 7.32\").    Weight as of this encounter: 60.6 kg (133 lb 11.2 oz). Body surface area is 1.7 meters squared.  No Pain (0) Comment: Data Unavailable   No LMP recorded. Patient has had a hysterectomy.  Allergies reviewed: Yes  Medications reviewed: Yes    Medications: Medication refills not needed today.  Pharmacy name entered into [employer] Systems: [id number] - [place], [place] - [address] AT [facility] [address]    Clinical concerns: None       Dafne Pickett LPN            "

## 2021-12-16 NOTE — LETTER
2021         RE: Jaelyn Jeffries  [address] [facility] [address]        Dear Colleague,    Thank you for referring your patient, Jaelyn Jeffries, to the [facility]. Please see a copy of my visit note below.    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  She is s/p modified radical hysterectomy with sentinel lymph node dissection 2016, which served as her treatment. She is here today for a surveillance visit.     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    2018: PAP WNL:  SPECIMEN ADEQUACY:   Satisfactory for evaluation.     DIAGNOSTIC INTERPRETATION:   Negative for intraepithelial lesions and malignancy     PRIOR PAP SMEAR DIAGNOSES:   DATE ACCESSION #    DIAGNOSIS   17  [id number]    Negative   10/17/16  -[id number]    LGSIL   16  -[id number]    Negative   10/07/15  -[id number]    JATINDER   04/15/15  -[id number]    Negative   01/07/15  -[id number]    Negative   14  -[id number]    ASCUS sugg, r/o KEITH   14  [id number]    Carcinoma   10/31/13  -[id number]    Negative   13  -[id number]    Negative   10/24/12  -[id number]    JATINDER   12  -[id number]    JATINDER   12  -[id number]    Negative   11  -[id number]    Negative   11  -[id number]    ASCUS   09/08/10  -[id number]    Negative   05/20/10  [id number]    Negative   04/14/10  [id number]    JATINDER   01/14/10  -[id number]    Negative   09  [id number]    Negative     Patient presents today  c/o right Bartholin abscess     11/07/2018: S/p Bartholyn cyst marcupalization  DIAGNOSIS:  Bartholin's gland cyst wall, excision:  - Bartholin's gland cyst wall with severe acute inflammation and  reactive changes.    1/24/2019: PAP unsatisfactory for evaluation     2/12/2019: S/p rpt PAP WNL/HRHPV pos:  SPECIMEN ADEQUACY:   Satisfactory for evaluation.     DIAGNOSTIC INTERPRETATION:   Negative for intraepithelial lesions and malignancy     HPV RESULTS:   Positive for HPV, other High Risk Genotype   (31/33/35/39/45/51/52/56/58/59/66/68).   Negative for HPV 16/18 Genotypes     8/1/2019: S/p PAP showing ASCUS/HRHPV pos (HPV 16/18 neg):  DIAGNOSIS: CommentAbnormal Final [facility] 01   EPITHELIAL CELL ABNORMALITY.   ATYPICAL SQUAMOUS CELLS OF UNDETERMINED SIGNIFICANCE (ASC-US)   (VAGINAL).     HPV OTHER HR TYPES PositiveAbnormal Negative Final [facility] 02   HPV 16 Negative Negative Final [facility] 02   HPV 18 Negative Negative Final [facility] 02     8/06/2019: S/p colposcopy with vaginal cuff biopsy WNL:  DIAGNOSIS:     VAGINAL CUFF BIOPSY  -  Parakeratosis and occasional cells   suggestive of koilocytes.     11/05/2019: PAP WNL/HPV HR neg  6/9/2020: pap-NIL/HPV HR negative  12/21/2020: Pap NIL.  HPV neg  12/16/2021 pap pending           Today she reports feeling well and is without concern. She does think that she might have a vaginal yeast infection. She denies any vaginal bleeding, no changes in her bowel or bladder habits, no nausea/emesis, no lower extremity edema, and no difficulties eating or sleeping. She denies any abdominal discomfort/bloating, no fevers or chills, and no chest pain or shortness of breath. She is current with her annual physical, mammogram, and she is fully vaccinated against COVID. Reports urinary incotinence about three months ago that resolved after a cold. She is interested in pelvic floor PT for prevention of pelvic floor weakness s/p hysterectomy.         Health Maintenance  Mammogram:  1/25/21  Annual physical: 2/15/21  COVID vaccine: 3/15/21, 4/9/21      Review of Systems:    Review of Systems     Constitutional:  Negative for fever, chills, weight loss, weight gain, fatigue, decreased appetite, night sweats, recent stressors, height gain, height loss, post-operative complications, incisional pain, hallucinations, increased energy, hyperactivity and confused.   HENT:  Negative for ear pain, hearing loss, tinnitus, nosebleeds, trouble swallowing, hoarse voice, mouth sores, sore throat, ear discharge, tooth pain, gum tenderness, taste disturbance, smell disturbance, hearing aid, bleeding gums, dry mouth, sinus pain, sinus congestion and neck mass.    Eyes:  Negative for double vision, pain, redness, eye pain, decreased vision, eye watering, eye bulging, eye dryness, flashing lights, spots, floaters, strabismus, tunnel vision, jaundice and eye irritation.   Respiratory:   Negative for cough, hemoptysis, sputum production, shortness of breath, wheezing, sleep disturbances due to breathing, snores loudly, respiratory pain, dyspnea on exertion, cough disturbing sleep and postural dyspnea.    Cardiovascular:  Negative for chest pain, dyspnea on exertion, palpitations, orthopnea, claudication, leg swelling, fingers/toes turn blue, hypertension, hypotension, syncope, history of heart murmur, chest pain on exertion, chest pain at rest, pacemaker, few scattered varicosities, leg pain, sleep disturbances due to breathing, tachycardia, light-headedness, exercise intolerance and edema.   Gastrointestinal:  Negative for heartburn, nausea, vomiting, abdominal pain, diarrhea, constipation, blood in stool, melena, rectal pain, bloating, hemorrhoids, bowel incontinence, jaundice, rectal bleeding, coffee ground emesis and change in stool.   Genitourinary:  Negative for bladder incontinence, dysuria, urgency, hematuria, flank pain, vaginal discharge, difficulty urinating, genital sores, dyspareunia, decreased libido,  nocturia, voiding less frequently, arousal difficulty, abnormal vaginal bleeding, excessive menstruation, menstrual changes, hot flashes, vaginal dryness and postmenopausal bleeding.   Musculoskeletal:  Negative for myalgias, back pain, joint swelling, arthralgias, stiffness, muscle cramps, neck pain, bone pain, muscle weakness and fracture.   Skin:  Negative for nail changes, itching, poor wound healing, rash, hair changes, skin changes, acne, warts, poor wound healing, scarring, flaky skin, Raynaud's phenomenon, sensitivity to sunlight and skin thickening.   Neurological:  Negative for dizziness, tingling, tremors, speech change, seizures, loss of consciousness, weakness, light-headedness, numbness, headaches, disturbances in coordination, extremity numbness, memory loss, difficulty walking and paralysis.   Endo/Heme:  Negative for anemia, swollen glands and bruises/bleeds easily.   Psychiatric/Behavioral:  Negative for depression, hallucinations, memory loss, decreased concentration, mood swings and panic attacks.    Breast:  Negative for breast discharge, breast mass, breast pain and nipple retraction.   Endocrine:  Negative for altered temperature regulation, polyphagia, polydipsia, unwanted hair growth and change in facial hair.        Past Medical History:    No past medical history on file.      Past Surgical History:    No past surgical history on file.      Health Maintenance Due   Topic Date Due     PREVENTIVE CARE VISIT  Never done     ADVANCE CARE PLANNING  Never done     MAMMO SCREENING  Never done     HIV SCREENING  Never done     HEPATITIS C SCREENING  Never done     DTAP/TDAP/TD IMMUNIZATION (1 - Tdap) Never done     LIPID  Never done     PHQ-2  Never done       Current Medications:     No current outpatient medications on file.         Allergies:      No Known Allergies     Social History:     Social History     Tobacco Use     Smoking status: Never Smoker     Smokeless tobacco: Never Used    Substance Use Topics     Alcohol use: Not on file       History   Drug Use Not on file         Family History:     The patient's family history is notable for:    No family history on file.      Physical Exam:     /71   Pulse 76   Temp 99  F (37.2  C) (Oral)   Resp 18   Wt 60.6 kg (133 lb 11.2 oz)   SpO2 95%   BMI 20.74 kg/m    Body mass index is 20.74 kg/m .    General Appearance: healthy and alert, no distress     HEENT: no palpable nodules or masses        Cardiovascular: regular rate and rhythm, no gallops, rubs or murmurs     Respiratory: lungs clear, no rales, rhonchi or wheezes    Musculoskeletal: extremities non tender and without edema    Skin: no lesions or rashes     Neurological: normal gait, no gross defects     Psychiatric: appropriate mood and affect                               Hematological: normal cervical, supraclavicular and inguinal lymph nodes     Gastrointestinal:       abdomen soft, non-tender, non-distended, no organomegaly or masses    Genitourinary: External genitalia and urethral meatus appears normal. Thick white discharge adherent to vagina walls. Vagina is smooth without nodularity or masses.  Cervix is surgically absent.  Bimanual exam reveal no masses, nodularity or fullness.  Recto-vaginal exam confirms these findings. Pap and wet prep collected.       Assessment:    Jaelyn Jeffries is a 46 year old woman with a history of stage IA2 adenocarcinoma of the cervix.  She is s/p modified radical hysterectomy with sentinel lymph node dissection 11/2016, which served as her treatment.  She is here today for a surveillance visit.      20 minutes spent on the date of the encounter doing chart review, history and exam, documentation, and further activities as noted above.      Plan:     1.) Cervical adenocarcinoma:  PATRICK on exam. At this point she will be 5 years post treatment and can begin extending her visits to yearly.  RTC in one year [place]. These can be done here or with  her PCP if they are comfortable.  At this point she is inclined to continue with the clinic here. Reviewed signs and symptoms for when she should contact the clinic or seek additional care. Patient to contact the clinic with any questions or concerns in the interim.     2.)        Genetic risk factors were assessed and she does not meet qualification for referral.    3.)        Labs and/or tests ordered include:  Vaginal pap, wet prep.     4.) Health maintenance:  Issues addressed today include following up with PCP for annual health maintenance and non-gynecologic issues.     5.)        Pelvic floor weakness: refer to pelvic floor PT        ANDRE Stephens, NP-BC  Women's Health Nurse Practitioner  Division of Gynecologic Oncology  [facility]      CC  Patient Care Team:  Clarke Lopez MD as PCP - General (Family Medicine)  April García APRN CNP as Assigned Cancer Care Provider  FELIPE SOLOMON

## 2021-12-21 LAB
BKR LAB AP GYN ADEQUACY: ABNORMAL
BKR LAB AP GYN INTERPRETATION: ABNORMAL
BKR LAB AP HPV REFLEX: NO
BKR LAB AP PREVIOUS ABNL DX: ABNORMAL
BKR LAB AP PREVIOUS ABNORMAL: ABNORMAL
PATH REPORT.COMMENTS IMP SPEC: ABNORMAL
PATH REPORT.COMMENTS IMP SPEC: ABNORMAL
PATH REPORT.RELEVANT HX SPEC: ABNORMAL

## 2021-12-21 PROCEDURE — 88141 CYTOPATH C/V INTERPRET: CPT | Performed by: PATHOLOGY

## 2022-01-19 ENCOUNTER — PATIENT OUTREACH (OUTPATIENT)
Dept: ONCOLOGY | Facility: CLINIC | Age: 47
End: 2022-01-19

## 2022-01-29 NOTE — PROGRESS NOTES
Reviewed with Dr Roblero pt recent pap smear results and NP follow up plan  MD reviewed pt chart   Agrees with NP follow up plan, states this is normal follow up and this type of result really does not make any difference to cancer diagnosis and follow up       Informed pt of md advice on pap smear and follow up   Pt reports understanding and will plan to see np in 1 year

## 2022-01-29 NOTE — PROGRESS NOTES
Spoke with pt   discussed pap smear results-ascus with pt  Explained ascus and its correlation to cervix cancer and needed follow up   Reassured pt that ascus is not a concern in her cancer follow up  Explained yearly follow up common and appropriate for current plan  Explained to pt I did review this with one of our other gyn onc md's who reviewed your records and stated that the follow up is appropriate  Reassured pt I will check with dr colindres her primary gyn onc on her opinion and get back to pt

## 2022-01-30 ENCOUNTER — HEALTH MAINTENANCE LETTER (OUTPATIENT)
Age: 47
End: 2022-01-30

## 2022-03-27 ENCOUNTER — HEALTH MAINTENANCE LETTER (OUTPATIENT)
Age: 47
End: 2022-03-27

## 2022-09-24 ENCOUNTER — HEALTH MAINTENANCE LETTER (OUTPATIENT)
Age: 47
End: 2022-09-24

## 2022-10-26 NOTE — PROGRESS NOTES
Gynecologic Oncology Follow Up Note    Date: 10/27/2022        RE: Jaelyn Jeffries  : 1975  NIKOLAS: 10/27/2022          Jaelyn Jeffries is a 47 year old woman with a history of stage IA2 adenocarcinoma of the cervix.  She is s/p modified radical hysterectomy with sentinel lymph node dissection 2016, which served as her treatment. She is here today for a surveillance visit.     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    2018: PAP WNL:  SPECIMEN ADEQUACY:   Satisfactory for evaluation.     DIAGNOSTIC INTERPRETATION:   Negative for intraepithelial lesions and malignancy     PRIOR PAP SMEAR DIAGNOSES:   DATE ACCESSION #    DIAGNOSIS   17  [id number]    Negative   10/17/16  [id number]    LGSIL   16  -[id number]    Negative   10/07/15  -[id number]    JATINDER   04/15/15  -[id number]    Negative   01/07/15  -[id number]    Negative   14  -[id number]    ASCUS sugg, r/o KEITH   14  -[id number]    Carcinoma   10/31/13  -[id number]    Negative   13  -[id number]    Negative   10/24/12  -[id number]    JATINDER   12  -12-05737    JATINDER   12  -[id number]    Negative   11  -11-92435    Negative   11  -11-78162    ASCUS   09/08/10  -[id number]    Negative   05/20/10  -[id number]    Negative   04/14/10  -[id number]    JATINDER   01/14/10  -[id number]    Negative   09  -[id number]    Negative     Patient presents today c/o right Bartholin abscess     2018: S/p Bartholyn cyst marcupalization  DIAGNOSIS:  Bartholin's gland cyst wall, excision:  - Bartholin's gland cyst wall with severe acute inflammation and  reactive changes.    2019: PAP unsatisfactory for  evaluation     2/12/2019: S/p rpt PAP WNL/HRHPV pos:  SPECIMEN ADEQUACY:   Satisfactory for evaluation.     DIAGNOSTIC INTERPRETATION:   Negative for intraepithelial lesions and malignancy     HPV RESULTS:   Positive for HPV, other High Risk Genotype   (31/33/35/39/45/51/52/56/58/59/66/68).   Negative for HPV 16/18 Genotypes     8/1/2019: S/p PAP showing ASCUS/HRHPV pos (HPV 16/18 neg):  DIAGNOSIS: CommentAbnormal Final [facility] 01   EPITHELIAL CELL ABNORMALITY.   ATYPICAL SQUAMOUS CELLS OF UNDETERMINED SIGNIFICANCE (ASC-US)   (VAGINAL).     HPV OTHER HR TYPES PositiveAbnormal Negative Final [facility] 02   HPV 16 Negative Negative Final [facility] 02   HPV 18 Negative Negative Final [facility] 02     8/06/2019: S/p colposcopy with vaginal cuff biopsy WNL:  DIAGNOSIS:     VAGINAL CUFF BIOPSY  -  Parakeratosis and occasional cells   suggestive of koilocytes.     11/05/2019: PAP WNL/HPV HR neg  6/9/2020: pap-NIL/HPV HR negative  12/21/2020: Pap NIL.  HPV neg  12/16/2021 Pap ASCUS  10/27/2022: Pap pending                Right labia with potentially recurrent right Bartholin's abscess. Pt reports that she had this in 2018 excised. This appeared last Monday. Improved over the last couple days with neosporon . Continues to have swelling but improving and significantly smaller in size.  Minor pain now. No fevers or chills. Feels systemically well.        She denies any vaginal bleeding, no changes in her bowel or bladder habits, no nausea/emesis, no lower extremity edema, and no difficulties eating or sleeping. She denies any abdominal discomfort/bloating, no fevers or chills, and no chest pain or shortness of breath. PT will consider pelvic pt in the future after shoulder PT complete for urinary urgency. No UTI symptoms.                 Health Maintenance  Mammogram: 1/25/21  Annual physical: 2/15/21  COVID vaccine: 3/15/21, 4/9/21      Review of Systems:     ROS: 10 point ROS neg other than the symptoms noted above in the  HPI.         Past Medical History:      Carcinoma in situ of endocervix     Fungal infection     Constipation, chronic     Fibrocystic breast changes     Abnormal Pap smear     Depression     Anxiety     Neck pain on right side     Pain of right scapula     Labral tear of shoulder     Cervicalgia     Adenocarcinoma in situ (AIS) of uterine cervix     Overactive bladder     Cyst of Bartholin's gland     Bartholin gland cyst   Labral tear of hip, degenerative     Laceration of hip, right 2012   labial tear        Past Surgical History:      CONIZATION CERVIX,KNIFE/LASER 5/2010, 10/2010   x2     HX EXTERNAL EAR SURGERY age 5     HX NASAL SEPTUM SURGERY 2007   with rhinoplasty     ORAL SURGERY PROCEDURE     TOTAL HYSTERECTOMY 11/18/16   radical, salpingectomy B/L, Bartholin cyst marsupalization           Health Maintenance Due   Topic Date Due     ADVANCE CARE PLANNING  Never done     MAMMO SCREENING  Never done     HEPATITIS B IMMUNIZATION (1 of 3 - 3-dose series) Never done     COLORECTAL CANCER SCREENING  Never done     HIV SCREENING  Never done     HEPATITIS C SCREENING  Never done     LIPID  Never done     PHQ-2 (once per calendar year)  Never done       Current Medications:     No current outpatient medications on file.         Allergies:      No Known Allergies     Social History:     Social History     Tobacco Use     Smoking status: Never     Smokeless tobacco: Never   Substance Use Topics     Alcohol use: Not on file       History   Drug Use Not on file         Family History:     The patient's family history is notable for:    No family history on file.      Physical Exam:     /69   Pulse 69   Temp 98.3  F (36.8  C) (Oral)   Wt 61.5 kg (135 lb 9.6 oz)   SpO2 98%   BMI 21.03 kg/m    Body mass index is 21.03 kg/m .    General Appearance: healthy and alert, no distress     HEENT: no palpable nodules or masses        Cardiovascular: regular rate and rhythm, no gallops, rubs or  murmurs     Respiratory: lungs clear, no rales, rhonchi or wheezes    Musculoskeletal: extremities non tender and without edema    Skin: no lesions or rashes     Neurological: normal gait, no gross defects     Psychiatric: appropriate mood and affect                               Hematological: normal cervical, supraclavicular and inguinal lymph nodes     Gastrointestinal:       abdomen soft, non-tender, non-distended, no organomegaly or masses    Genitourinary: External genitalia and urethral meatus appears normal however there is a 2.5 x 0.5 tender erythematous soft to semi-soft superficial papule. No drainage and no fluctuance. Vagina is smooth without nodularity or masses.  Cervix is surgically absent.  Bimanual exam reveal no masses, nodularity or fullness.  Recto-vaginal exam confirms these findings.                   Assessment:    Jaelyn Jeffreis is a 47 year old woman with a history of stage IA2 adenocarcinoma of the cervix.  She is s/p modified radical hysterectomy with sentinel lymph node dissection 11/2016, which served as her treatment.  She is here today for a surveillance visit.      20 minutes spent on the date of the encounter doing chart review, history and exam, documentation, and further activities as noted above.      Plan:     1.) Vulvar folliculitis: Dr. Berg present for vulvar exam. Lesion not near bartholin's glands. Likely to be a folliculitis from recent shaving and swimming. Improving per pt over the last week with neosporon. Lesion not open and minimal firmness/tenderness on exam. No fevers/chills. No abx per MD. I will see pt back in one month for pap and reevaluate vulva then. Pt will call if papule does not resolve with neosporon and warm compresses.      Cervical adenocarcinoma:  PATRICK on exam. At this point she will be 5 years post treatment and can begin extending her visits to yearly.  Pap due 12/2022. These can be done here or with her PCP if they are comfortable.  At this point  she is inclined to continue with the clinic here. Reviewed signs and symptoms for when she should contact the clinic or seek additional care. Patient to contact the clinic with any questions or concerns in the interim.     2.)        Genetic risk factors were assessed and she does not meet qualification for referral.    3.)        Labs and/or tests ordered include: none     4.) Health maintenance:  Issues addressed today include following up with PCP for annual health maintenance and non-gynecologic issues.           ANDRE Stephens, NP-BC  Women's Health Nurse Practitioner  Division of Gynecologic Oncology  [facility]      CC  Patient Care Team:  Calixto Amador MD as PCP - General  Ana Fernandes APRN CNP as Assigned Cancer Care Provider  FELIPE SOLOMON

## 2022-10-27 ENCOUNTER — ONCOLOGY VISIT (OUTPATIENT)
Dept: ONCOLOGY | Facility: CLINIC | Age: 47
End: 2022-10-27
Attending: OBSTETRICS & GYNECOLOGY
Payer: COMMERCIAL

## 2022-10-27 VITALS
BODY MASS INDEX: 21.03 KG/M2 | WEIGHT: 135.6 LBS | OXYGEN SATURATION: 98 % | TEMPERATURE: 98.3 F | SYSTOLIC BLOOD PRESSURE: 103 MMHG | HEART RATE: 69 BPM | DIASTOLIC BLOOD PRESSURE: 69 MMHG

## 2022-10-27 DIAGNOSIS — C53.9 CERVICAL ADENOCARCINOMA (H): Primary | ICD-10-CM

## 2022-10-27 PROCEDURE — 99213 OFFICE O/P EST LOW 20 MIN: CPT | Performed by: OBSTETRICS & GYNECOLOGY

## 2022-10-27 PROCEDURE — G0463 HOSPITAL OUTPT CLINIC VISIT: HCPCS

## 2022-10-27 ASSESSMENT — ENCOUNTER SYMPTOMS
NERVOUS/ANXIOUS: 0
INSOMNIA: 0

## 2022-10-27 ASSESSMENT — PAIN SCALES - GENERAL: PAINLEVEL: NO PAIN (0)

## 2022-10-27 NOTE — NURSING NOTE
"Oncology Rooming Note    October 27, 2022 10:30 AM   Jaelyn Jeffries is a 47 year old female who presents for:    Chief Complaint   Patient presents with     Oncology Clinic Visit     Cervical adenocarcinoma     Initial Vitals: /69   Pulse 69   Temp 98.3  F (36.8  C) (Oral)   Wt 61.5 kg (135 lb 9.6 oz)   SpO2 98%   BMI 21.03 kg/m   Estimated body mass index is 21.03 kg/m  as calculated from the following:    Height as of 12/21/20: 1.71 m (5' 7.32\").    Weight as of this encounter: 61.5 kg (135 lb 9.6 oz). Body surface area is 1.71 meters squared.  No Pain (0) Comment: Data Unavailable   No LMP recorded. Patient has had a hysterectomy.  Allergies reviewed: Yes  Medications reviewed: Yes    Medications: Medication refills not needed today.  Pharmacy name entered into [facility]Â®: [id number] - [place], [place] - [address] AT [facility] [address]    Clinical concerns: none       Bethany Arriola            "

## 2022-10-27 NOTE — LETTER
10/27/2022         RE: Jaelyn Jeffries  [address] [facility] [address]        Dear Colleague,    Thank you for referring your patient, Jaelyn Jeffries, to the [facility]. Please see a copy of my visit note below.    Gynecologic Oncology Follow Up Note    Date: 10/27/2022        RE: Jaelyn Jeffries  : 1975  NIKOLAS: 10/27/2022    Jaelyn Jeffries is a 47 year old woman with a history of stage IA2 adenocarcinoma of the cervix.  She is s/p modified radical hysterectomy with sentinel lymph node dissection 2016, which served as her treatment. She is here today for a surveillance visit.     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    2018: PAP WNL:  SPECIMEN ADEQUACY:   Satisfactory for evaluation.     DIAGNOSTIC INTERPRETATION:   Negative for intraepithelial lesions and malignancy     PRIOR PAP SMEAR DIAGNOSES:   DATE ACCESSION #    DIAGNOSIS   17  [id number]    Negative   10/17/16  [id number]    LGSIL   16  [id number]    Negative   10/07/15  [id number]    JATINDER   04/15/15  [id number]    Negative   01/07/15  [id number]    Negative   14  [id number]    ASCUS sugg, r/o KEITH   14  [id number]    Carcinoma   10/31/13  [id number]    Negative   13  [id number]    Negative   10/24/12  [id number]    JATINDER   12  [id number]    JATINDER   12  [id number]    Negative   11  -[id number]    Negative   11  -[id number]    ASCUS   09/08/10  -[id number]    Negative   05/20/10  [id number]    Negative   04/14/10  [id number]    JATINDER   01/14/10  -[id number]    Negative   09  [id number]    Negative     Patient presents today c/o  right Bartholin abscess     11/07/2018: S/p Bartholyn cyst marcupalization  DIAGNOSIS:  Bartholin's gland cyst wall, excision:  - Bartholin's gland cyst wall with severe acute inflammation and  reactive changes.    1/24/2019: PAP unsatisfactory for evaluation     2/12/2019: S/p rpt PAP WNL/HRHPV pos:  SPECIMEN ADEQUACY:   Satisfactory for evaluation.     DIAGNOSTIC INTERPRETATION:   Negative for intraepithelial lesions and malignancy     HPV RESULTS:   Positive for HPV, other High Risk Genotype   (31/33/35/39/45/51/52/56/58/59/66/68).   Negative for HPV 16/18 Genotypes     8/1/2019: S/p PAP showing ASCUS/HRHPV pos (HPV 16/18 neg):  DIAGNOSIS: CommentAbnormal Final [facility] 01   EPITHELIAL CELL ABNORMALITY.   ATYPICAL SQUAMOUS CELLS OF UNDETERMINED SIGNIFICANCE (ASC-US)   (VAGINAL).     HPV OTHER HR TYPES PositiveAbnormal Negative Final [facility] 02   HPV 16 Negative Negative Final [facility] 02   HPV 18 Negative Negative Final [facility] 02     8/06/2019: S/p colposcopy with vaginal cuff biopsy WNL:  DIAGNOSIS:     VAGINAL CUFF BIOPSY  -  Parakeratosis and occasional cells   suggestive of koilocytes.     11/05/2019: PAP WNL/HPV HR neg  6/9/2020: pap-NIL/HPV HR negative  12/21/2020: Pap NIL.  HPV neg  12/16/2021 Pap ASCUS  10/27/2022: Pap pending                Right labia with potentially recurrent right Bartholin's abscess. Pt reports that she had this in 2018 excised. This appeared last Monday. Improved over the last couple days with neosporon . Continues to have swelling but improving and significantly smaller in size.  Minor pain now. No fevers or chills. Feels systemically well.        She denies any vaginal bleeding, no changes in her bowel or bladder habits, no nausea/emesis, no lower extremity edema, and no difficulties eating or sleeping. She denies any abdominal discomfort/bloating, no fevers or chills, and no chest pain or shortness of breath. PT will consider pelvic pt in the future after shoulder PT complete for  urinary urgency. No UTI symptoms.                 Health Maintenance  Mammogram: 1/25/21  Annual physical: 2/15/21  COVID vaccine: 3/15/21, 4/9/21      Review of Systems:     ROS: 10 point ROS neg other than the symptoms noted above in the HPI.         Past Medical History:      Carcinoma in situ of endocervix     Fungal infection     Constipation, chronic     Fibrocystic breast changes     Abnormal Pap smear     Depression     Anxiety     Neck pain on right side     Pain of right scapula     Labral tear of shoulder     Cervicalgia     Adenocarcinoma in situ (AIS) of uterine cervix     Overactive bladder     Cyst of Bartholin's gland     Bartholin gland cyst   Labral tear of hip, degenerative     Laceration of hip, right 2012   labial tear        Past Surgical History:      CONIZATION CERVIX,KNIFE/LASER 5/2010, 10/2010   x2     HX EXTERNAL EAR SURGERY age 5     HX NASAL SEPTUM SURGERY 2007   with rhinoplasty     ORAL SURGERY PROCEDURE     TOTAL HYSTERECTOMY 11/18/16   radical, salpingectomy B/L, Bartholin cyst marsupalization           Health Maintenance Due   Topic Date Due     ADVANCE CARE PLANNING  Never done     MAMMO SCREENING  Never done     HEPATITIS B IMMUNIZATION (1 of 3 - 3-dose series) Never done     COLORECTAL CANCER SCREENING  Never done     HIV SCREENING  Never done     HEPATITIS C SCREENING  Never done     LIPID  Never done     PHQ-2 (once per calendar year)  Never done       Current Medications:     No current outpatient medications on file.         Allergies:      No Known Allergies     Social History:     Social History     Tobacco Use     Smoking status: Never     Smokeless tobacco: Never   Substance Use Topics     Alcohol use: Not on file       History   Drug Use Not on file         Family History:     The patient's family history is notable for:    No family history on file.      Physical Exam:     /69   Pulse 69   Temp 98.3  F (36.8  C) (Oral)   Wt 61.5 kg (135 lb 9.6 oz)   SpO2 98%    BMI 21.03 kg/m    Body mass index is 21.03 kg/m .    General Appearance: healthy and alert, no distress     HEENT: no palpable nodules or masses        Cardiovascular: regular rate and rhythm, no gallops, rubs or murmurs     Respiratory: lungs clear, no rales, rhonchi or wheezes    Musculoskeletal: extremities non tender and without edema    Skin: no lesions or rashes     Neurological: normal gait, no gross defects     Psychiatric: appropriate mood and affect                               Hematological: normal cervical, supraclavicular and inguinal lymph nodes     Gastrointestinal:       abdomen soft, non-tender, non-distended, no organomegaly or masses    Genitourinary: External genitalia and urethral meatus appears normal however there is a 2.5 x 0.5 tender erythematous soft to semi-soft superficial papule. No drainage and no fluctuance. Vagina is smooth without nodularity or masses.  Cervix is surgically absent.  Bimanual exam reveal no masses, nodularity or fullness.  Recto-vaginal exam confirms these findings.                   Assessment:    Jaelyn Jeffries is a 47 year old woman with a history of stage IA2 adenocarcinoma of the cervix.  She is s/p modified radical hysterectomy with sentinel lymph node dissection 11/2016, which served as her treatment.  She is here today for a surveillance visit.      20 minutes spent on the date of the encounter doing chart review, history and exam, documentation, and further activities as noted above.      Plan:     1.) Vulvar folliculitis: Dr. Berg present for vulvar exam. Lesion not near bartholin's glands. Likely to be a folliculitis from recent shaving and swimming. Improving per pt over the last week with neosporon. Lesion not open and minimal firmness/tenderness on exam. No fevers/chills. No abx per MD. I will see pt back in one month for pap and reevaluate vulva then. Pt will call if papule does not resolve with neosporon and warm compresses.      Cervical  adenocarcinoma:  PATRICK on exam. At this point she will be 5 years post treatment and can begin extending her visits to yearly.  Pap due 12/2022. These can be done here or with her PCP if they are comfortable.  At this point she is inclined to continue with the clinic here. Reviewed signs and symptoms for when she should contact the clinic or seek additional care. Patient to contact the clinic with any questions or concerns in the interim.     2.)        Genetic risk factors were assessed and she does not meet qualification for referral.    3.)        Labs and/or tests ordered include: none     4.) Health maintenance:  Issues addressed today include following up with PCP for annual health maintenance and non-gynecologic issues.     ANDRE Stephens, NP-BC  Women's Health Nurse Practitioner  Division of Gynecologic Oncology  [facility]      CC  Patient Care Team:  Calixto Amador MD as PCP - Ana Jaquez APRN CNP as Assigned Cancer Care Provider  FELIPE SOLOMON

## 2022-12-23 ENCOUNTER — OFFICE VISIT (OUTPATIENT)
Dept: OBGYN | Facility: CLINIC | Age: 47
End: 2022-12-23
Payer: COMMERCIAL

## 2022-12-23 VITALS
SYSTOLIC BLOOD PRESSURE: 96 MMHG | WEIGHT: 137 LBS | DIASTOLIC BLOOD PRESSURE: 56 MMHG | HEIGHT: 67 IN | BODY MASS INDEX: 21.5 KG/M2

## 2022-12-23 DIAGNOSIS — Z12.31 VISIT FOR SCREENING MAMMOGRAM: Primary | ICD-10-CM

## 2022-12-23 DIAGNOSIS — C53.0: ICD-10-CM

## 2022-12-23 DIAGNOSIS — N95.1 PERIMENOPAUSAL SYMPTOMS: ICD-10-CM

## 2022-12-23 PROBLEM — N75.0 CYST OF BARTHOLIN'S GLAND DUCT: Status: ACTIVE | Noted: 2018-11-07

## 2022-12-23 PROBLEM — S43.439A LABRAL TEAR OF SHOULDER: Status: ACTIVE | Noted: 2022-12-23

## 2022-12-23 PROBLEM — M89.8X1 PAIN OF RIGHT SCAPULA: Status: ACTIVE | Noted: 2022-12-23

## 2022-12-23 PROBLEM — F32.A DEPRESSION: Status: ACTIVE | Noted: 2022-12-23

## 2022-12-23 PROBLEM — F41.9 ANXIETY: Status: ACTIVE | Noted: 2022-12-23

## 2022-12-23 PROBLEM — R87.619 ABNORMAL PAP SMEAR: Status: ACTIVE | Noted: 2022-12-23

## 2022-12-23 LAB
ESTRADIOL SERPL-MCNC: 48 PG/ML
FSH SERPL IRP2-ACNC: 16.5 MIU/ML
TSH SERPL DL<=0.005 MIU/L-ACNC: 1.01 UIU/ML (ref 0.3–4.2)

## 2022-12-23 PROCEDURE — 83001 ASSAY OF GONADOTROPIN (FSH): CPT | Performed by: OBSTETRICS & GYNECOLOGY

## 2022-12-23 PROCEDURE — 84443 ASSAY THYROID STIM HORMONE: CPT | Performed by: OBSTETRICS & GYNECOLOGY

## 2022-12-23 PROCEDURE — 36415 COLL VENOUS BLD VENIPUNCTURE: CPT | Performed by: OBSTETRICS & GYNECOLOGY

## 2022-12-23 PROCEDURE — 82670 ASSAY OF TOTAL ESTRADIOL: CPT | Performed by: OBSTETRICS & GYNECOLOGY

## 2022-12-23 PROCEDURE — 99203 OFFICE O/P NEW LOW 30 MIN: CPT | Performed by: OBSTETRICS & GYNECOLOGY

## 2022-12-23 PROCEDURE — 88175 CYTOPATH C/V AUTO FLUID REDO: CPT | Performed by: OBSTETRICS & GYNECOLOGY

## 2022-12-23 NOTE — NURSING NOTE
"Chief Complaint   Patient presents with     Gyn Exam     initial BP 96/56   Ht 1.702 m (5' 7\")   Wt 62.1 kg (137 lb)   BMI 21.46 kg/m   Estimated body mass index is 21.46 kg/m  as calculated from the following:    Height as of this encounter: 1.702 m (5' 7\").    Weight as of this encounter: 62.1 kg (137 lb).  BP completed using cuff size regular.  Carley Ibarra CMA    "

## 2022-12-23 NOTE — PROGRESS NOTES
"Chief Complaint   Patient presents with     Gyn Exam       Subjective:  47-year-old with a history of stage IA2 adenocarcinoma of the cervix presents to establish gynecologic care.  She is status post modified radical hysterectomy with sentinel lymph node dissection in November 2016 which served as her treatment. This was performed in [place].  She is presently being surveilled by the GYN oncology service at the [facility].    Her gynecologic history is also notable for drainage of a right Bartholin's abscess in [place] in 2018.    She has noted some increasing sweats and chills and wonders whether she could be entering menopause. interested interested in getting some labs done in this regard    REVIEW OF SYSTEMS:  She had recently had some increased swelling in the vulva concerning for recurrence of the Bartholin's cyst but this has spontaneously resolved    Health Maintenance   Topic Date Due     ADVANCE CARE PLANNING  Never done     MAMMO SCREENING  Never done     HEPATITIS B IMMUNIZATION (1 of 3 - 3-dose series) Never done     COLORECTAL CANCER SCREENING  Never done     HIV SCREENING  Never done     HEPATITIS C SCREENING  Never done     LIPID  Never done     PHQ-2 (once per calendar year)  Never done     YEARLY PREVENTIVE VISIT  04/05/2023     HPV TEST  12/16/2026     PAP  12/16/2026     DTAP/TDAP/TD IMMUNIZATION (2 - Td or Tdap) 09/27/2032     INFLUENZA VACCINE  Completed     COVID-19 Vaccine  Completed     Pneumococcal Vaccine: Pediatrics (0 to 5 Years) and At-Risk Patients (6 to 64 Years)  Aged Out     IPV IMMUNIZATION  Aged Out     MENINGITIS IMMUNIZATION  Aged Out       No Known Allergies    Objective:  Vitals: BP 96/56   Ht 1.702 m (5' 7\")   Wt 62.1 kg (137 lb)   BMI 21.46 kg/m    BMI= Body mass index is 21.46 kg/m .    Breasts symmetric bilaterally.  Overall fibrocystic pattern but no discrete masses.  No dimpling or retraction.  No axillary or supraclavicular adenopathy.    Abdomen: Soft " flat nontender    External genitalia without lesions.  Vulva is symmetric and without inflammation or nodularity.  Vagina well estrogenized and without lesions.  Vaginal apex well supported and intact.  Pap smear of the vaginal apex obtained.    Bimanual examination: Vaginal apex well supported without tenderness or nodularity.      Assessment/Plan:  1. Perimenopausal symptoms    - TSH with free T4 reflex; Future  - Estradiol; Future  - Follicle stimulating hormone; Future  - Physical Therapy Referral; Future  - TSH with free T4 reflex  - Estradiol  - Follicle stimulating hormone    2. Visit for screening mammogram    - *MA Screening Digital Bilateral; Future    3. Cancer of endocervix (H)  Pap of vaginal apex obtained - no visible or palpable lesions  - Pap diagnostic only        Rafy Lemon MD

## 2022-12-28 LAB
BKR LAB AP GYN ADEQUACY: NORMAL
BKR LAB AP GYN INTERPRETATION: NORMAL
BKR LAB AP HPV REFLEX: NO
BKR LAB AP PREVIOUS ABNL DX: NORMAL
BKR LAB AP PREVIOUS ABNORMAL: NORMAL
PATH REPORT.COMMENTS IMP SPEC: NORMAL
PATH REPORT.COMMENTS IMP SPEC: NORMAL
PATH REPORT.RELEVANT HX SPEC: NORMAL

## 2022-12-29 ENCOUNTER — PATIENT OUTREACH (OUTPATIENT)
Dept: OBGYN | Facility: CLINIC | Age: 47
End: 2022-12-29

## 2023-01-13 ENCOUNTER — THERAPY VISIT (OUTPATIENT)
Dept: PHYSICAL THERAPY | Facility: CLINIC | Age: 48
End: 2023-01-13
Payer: COMMERCIAL

## 2023-01-13 DIAGNOSIS — N95.1 PERIMENOPAUSAL SYMPTOMS: ICD-10-CM

## 2023-01-13 PROCEDURE — 97110 THERAPEUTIC EXERCISES: CPT | Mod: GP

## 2023-01-13 PROCEDURE — 97535 SELF CARE MNGMENT TRAINING: CPT | Mod: GP

## 2023-01-13 PROCEDURE — 97161 PT EVAL LOW COMPLEX 20 MIN: CPT | Mod: GP

## 2023-01-13 NOTE — PROGRESS NOTES
Physical Therapy Initial Evaluation  Subjective:    Therapist Generated HPI Evaluation  Problem details: SUBJECTIVE:  MD order: Perimenopausal symptoms  Chief complaint: Urinary urgency and ADA    Pt presents to physical therapy with pelvic floor weakness and urinary incontinence (stress and urge). Pt notes that she had a hysterectomy in 2016. Pt notes that the symptoms of urinary incontinence began right away after hysterectomy. Pt notes that she has urinary leakage 2x per week and she will only leak drops. Pt goes 3-4 hours between urination times. Pt denies numbness and tingling in the saddle area. Pt denies a full loss of urine. Goals for PT are to strengthen PF.     Urination:  Do you leak on the way to the bathroom or with a strong urge to void? Yes   Do you leak with cough,sneeze, jumping, running?Yes   Any other activities that cause leaking? No   Do you have triggers that make you feel you can't wait to go to the bathroom? Yes what are they Running water.  Type of pad and number used per day? NA  When you leak what is the amount? Drops  How long can you delay the need to urinate? Hours.   How many times do you get up to urinate at night? 0-1  Can you stop the flow of urine when on the toilet? Yes  Is the volume of urine passed usually: medium. (8sec rule= 250ml with average bladder storing 400-600ml)  Do you feel empty when you are done? Yes  Do you strain to pass urine? No  Do you have a slow or hesitant urinary stream? No  Do you have difficulty initiating the urine stream? No  Is urination painful? No  How many bladder infections have you had in last 12 months? 0   Fluid intake(one glass is 8oz or one cup) 2 glasses/day, 2 caffinated glasses/day  0-1 alcohol glasses/day.    Bowel habits:  Frequency of bowel movements? 7 times a week  Consistancy of stool? hard, [place] Stool Scale 2-3  Do you ignore the urge to defecate? No  Do you strain to pass stool? Yes- 40% of the time     Aggrevating factors:  Is  loss of stool associated with an activity (lifting, coughing, running) or a food)  No    Sensation:   Do you feel the rectum is empty with you finish a bowel movement?   Yes    Do you have abdominal pain?  No      Do you have rectal pain, pressure, or burning?  No     Pelvic Pain:  Do you have any pelvic pain with intercourse, exams, use of tampons? No  Is initial penetration during intercourse painful? No  Is deeper penetration painful? No  Do you use lubricant? Not asked      Given birth? No Any complications? No  Are you sexually active?No  Have you ever been worried for your physical safety? No   Do you have any depression, anxiety, panic attacks, excessive stress?  No  Any abdominal or pelvic surgeries? Hysterectomy 2016   Are you having any regular exercise? Tennis, weightlifting   Have you practiced the PF(kegel) exercises for 4 or more weeks? NO   .                                                        Objective:  System                                 Pelvic Dysfunction Evaluation:          Abdominal Wall:  Abdominal wall pelvic: Good TA activation, good breathing mechanics.  Diastasis Recti:  Normal      Pelvic Clock Exam:    Ischiocavernosis pain:  -  Bulbocavernosis pain:  -  Transverse Perineal:  -  Levator ANI:  -  Perineal Body:  -      External Assessment:  normal (Voluntary contraction- present, Involuntary contraction- absent, voluntary relaxation- present  )      Bearing Down/Coughing:  Normal    Introitus:  Normal  Muscle Contraction/Perineal Mobility:  Elevation and urogential triangle descent  Internal Assessment:  Internal assessment pelvic: 7 second endurance, 10 quick flicks     Contraction/Grade:  Good squeeze, good hold with lift, repeatable (4)                               General     ROS    Assessment/Plan:    Patient is a 47 year old female with pelvic complaints.    Patient has the following significant findings with corresponding treatment plan.                Diagnosis 1:  Urinary  incontinence   Decreased ROM/flexibility - manual therapy and therapeutic exercise  Decreased proprioception - neuro re-education and therapeutic activities  Impaired muscle performance - biofeedback, electric stimulation and neuro re-education  Decreased function - therapeutic activities    Therapy Evaluation Codes:   1) History comprised of:   Personal factors that impact the plan of care:      Time since onset of symptoms.    Comorbidity factors that impact the plan of care are:      Cancer.     Medications impacting care: None.  2) Examination of Body Systems comprised of:   Body structures and functions that impact the plan of care:      Pelvis.   Activity limitations that impact the plan of care are:      Stress incontinence, Urge incontinence and Urinary incontinence.  3) Clinical presentation characteristics are:   Stable/Uncomplicated.  4) Decision-Making    Low complexity using standardized patient assessment instrument and/or measureable assessment of functional outcome.  Cumulative Therapy Evaluation is: Low complexity.    Previous and current functional limitations:  (See Goal Flow Sheet for this information)    Short term and Long term goals: (See Goal Flow Sheet for this information)     Communication ability:  Patient appears to be able to clearly communicate and understand verbal and written communication and follow directions correctly.  Treatment Explanation - The following has been discussed with the patient:   RX ordered/plan of care  Anticipated outcomes  Possible risks and side effects  This patient would benefit from PT intervention to resume normal activities.   Rehab potential is excellent.    Frequency:  2 X a month, once daily  Duration:  for 12 weeks  Discharge Plan:  Achieve all LTG.  Independent in home treatment program.  Reach maximal therapeutic benefit.    Please refer to the daily flowsheet for treatment today, total treatment time and time spent performing 1:1 timed codes.

## 2023-01-25 ENCOUNTER — ANCILLARY PROCEDURE (OUTPATIENT)
Dept: MAMMOGRAPHY | Facility: CLINIC | Age: 48
End: 2023-01-25
Attending: OBSTETRICS & GYNECOLOGY
Payer: COMMERCIAL

## 2023-01-25 DIAGNOSIS — Z12.31 VISIT FOR SCREENING MAMMOGRAM: ICD-10-CM

## 2023-01-25 PROCEDURE — 77067 SCR MAMMO BI INCL CAD: CPT | Mod: TC | Performed by: RADIOLOGY

## 2023-01-25 PROCEDURE — 77063 BREAST TOMOSYNTHESIS BI: CPT | Mod: TC | Performed by: RADIOLOGY

## 2023-05-08 ENCOUNTER — HEALTH MAINTENANCE LETTER (OUTPATIENT)
Age: 48
End: 2023-05-08

## 2023-10-30 NOTE — LETTER
2020         RE: Jaelyn Jeffries  [address]  [facility] [address]        Dear Colleague,    Thank you for referring your patient, Jaelyn Jeffries, to the [facility]. Please see a copy of my visit note below.                            Consult Notes on Referred Patient    Date: 2020       Dr. Malena Bell MD  No address on file       RE: Jaelyn Jeffries  : 1975  NIKOLAS: 2020    Dear Dr. Referred Self:    I had the pleasure of seeing your patient Jaelyn Jeffries here at the Gynecologic Cancer Clinic at the [facility] on 2020.  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 Given these findings she was subsequently sent to the Gynecologic Cancer Clinic for new patient consultation to establish care.       Today: Just moved from [facility], has one year old boy-Tobias born from surrogate. Living in [place]. Working remotely here from [facility]. Was getting every 6 mos pap smears in [facility]. Here today for repeat pap. Feeling well overall except has a nodule in right axilla she would like me to look at.    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    2018: PAP WNL:  SPECIMEN ADEQUACY:   Satisfactory for evaluation.     DIAGNOSTIC INTERPRETATION:   Negative for intraepithelial lesions and malignancy     PRIOR PAP SMEAR DIAGNOSES:   DATE ACCESSION #    DIAGNOSIS   17  [id number]    Negative   10/17/16  [id number]    LGSIL   16  [id number]    Negative   10/07/15  [id number]    JATINDER   04/15/15  [id number]    Negative   01/07/15  [id number]    Negative   14   [id number]    ASCUS sugg, r/o KEITH   04/29/14  [id number]    Carcinoma   10/31/13  [id number]    Negative   05/16/13  [id number]    Negative   10/24/12  [id number]    JATINDER   07/27/12  [id number]    JATINDER   01/20/12  [id number]    Negative   06/29/11  [id number]    Negative   02/02/11  [id number]    ASCUS   09/08/10  [id number]    Negative   05/20/10  [id number]    Negative   04/14/10  [id number]    JATINDER   01/14/10  [id number]    Negative   09/09/09  [facility]-49418    Negative     Patient presents today c/o right Bartholin abscess     11/07/2018: S/p Bartholyn cyst marcupalization  DIAGNOSIS:  Bartholin's gland cyst wall, excision:  - Bartholin's gland cyst wall with severe acute inflammation and  reactive changes.    1/24/2019: PAP unsatisfactory for evaluation     2/12/2019: S/p rpt PAP WNL/HRHPV pos:  SPECIMEN ADEQUACY:   Satisfactory for evaluation.     DIAGNOSTIC INTERPRETATION:   Negative for intraepithelial lesions and malignancy     HPV RESULTS:   Positive for HPV, other High Risk Genotype   (31/33/35/39/45/51/52/56/58/59/66/68).   Negative for HPV 16/18 Genotypes     8/1/2019: S/p PAP showing ASCUS/HRHPV pos (HPV 16/18 neg):  DIAGNOSIS: CommentAbnormal Final [facility] 01   EPITHELIAL CELL ABNORMALITY.   ATYPICAL SQUAMOUS CELLS OF UNDETERMINED SIGNIFICANCE (ASC-US)   (VAGINAL).     HPV OTHER HR TYPES PositiveAbnormal Negative Final [facility] 02   HPV 16 Negative Negative Final [facility] 02   HPV 18 Negative Negative Final [facility] 02     8/06/2019: S/p colposcopy with vaginal cuff biopsy WNL:  DIAGNOSIS:     VAGINAL CUFF BIOPSY  -  Parakeratosis and occasional cells   suggestive of koilocytes.     11/05/2019: PAP WNL/HPV HR neg    6/9/2020: pap-NIL/HPV HR negative    Review of Systems:    Systemic           no weight changes; no fever; no chills; no night sweats; no appetite changes  Skin           no rashes, or lesions  Eye           no irritation; no changes in vision  Lila-Laryngeal           no  dysphagia; no hoarseness   Pulmonary    no cough; no shortness of breath  Cardiovascular    no chest pain; no palpitations  Gastrointestinal    no diarrhea; no constipation; no abdominal pain; no changes in bowel  habits; no blood in stool  Genitourinary   no urinary frequency; no urinary urgency; no dysuria; no pain; no abnormal vaginal discharge; no abnormal vaginal bleeding  Breast   no breast discharge; no breast changes; no breast pain  Musculoskeletal    no myalgias; no arthralgias; no back pain  Psychiatric           no depressed mood; no anxiety    Hematologic           no tender lymph nodes; no noticeable swellings or lumps   Endocrine    no hot flashes; no heat/cold intolerance         Neurological   no tremor; no numbness and tingling; no headaches; no difficulty  sleeping      Past Medical History:     Carcinoma in situ of endocervix     Fungal infection     Constipation, chronic     Fibrocystic breast changes     Abnormal Pap smear     Depression     Anxiety     Neck pain on right side     Pain of right scapula     Labral tear of shoulder     Cervicalgia     Adenocarcinoma in situ (AIS) of uterine cervix     Overactive bladder     Cyst of Bartholin's gland     Bartholin gland cyst   Labral tear of hip, degenerative     Laceration of hip, right 2012   labial tear       Past Surgical History:     CONIZATION CERVIX,KNIFE/LASER 5/2010, 10/2010   x2     HX EXTERNAL EAR SURGERY age 5     HX NASAL SEPTUM SURGERY 2007   with rhinoplasty     ORAL SURGERY PROCEDURE     TOTAL HYSTERECTOMY 11/18/16   radical, salpingectomy B/L, Bartholin cyst marsupalization       Health Maintenance:  Health Maintenance Due   Topic Date Due     PREVENTIVE CARE VISIT  1975     HIV SCREENING  07/25/1990     HEPATITIS C SCREENING  07/25/1993     DTAP/TDAP/TD IMMUNIZATION (1 - Tdap) 07/25/2000     PAP  01/09/2018     PHQ-2  01/01/2020     LIPID  07/25/2020       Last Pap Smear: 6/9/2020             Result: normal neg HR HPV  She  "has had a history of abnormal Pap smears.    Last Mammogram:  due 1/2021             Result: normal      She has not had a history of abnormal mammograms.    Last Colonoscopy: None              Result: not done                Patient has 1 year old child born via surrogate    Current Medications:     currently has no medications in their medication list.       Allergies:     No Known Allergies         Social History:     Social History     Tobacco Use     Smoking status: Not on file   Substance Use Topics     Alcohol use: Not on file       History   Drug Use Not on file           Family History:     The patient's family history is notable for the following:    Father's mother with ovarian cancer-  Patient was tested for BRCA negative at [facility]      Physical Exam:     /66   Pulse 76   Temp 97.8  F (36.6  C) (Tympanic)   Ht 1.71 m (5' 7.32\")   Wt 57.2 kg (126 lb)   SpO2 99%   BMI 19.55 kg/m    Body mass index is 19.55 kg/m .    General Appearance: healthy and alert, no distress     HEENT:  no thyromegaly, no palpable nodules or masses        Cardiovascular: regular rate and rhythm, no gallops, rubs or murmurs     Respiratory: lungs clear, no rales, rhonchi or wheezes, normal diaphragmatic excursion    Musculoskeletal: extremities non tender and without edema    Skin: Right axilla, raised firm bump. With pressure I am able to express whitish discharge from this until area is decompressed.     Neurological: normal gait, no gross defects     Psychiatric: appropriate mood and affect                               Hematological: normal cervical, supraclavicular and inguinal lymph nodes     Gastrointestinal:       abdomen soft, non-tender, non-distended, no organomegaly or masses    Genitourinary: External genitalia and urethral meatus appears normal.  Vagina is smooth without nodularity or masses.  Cervix absent, mild scar at apex.  Bimanual exam reveal no masses, nodularity or fullness.  Recto-vaginal exam " confirms these findings. Pap done of vaginal apex.      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  S/p Bartholin cyst marsupulization   Right axillary sebaceous cyst        Plan:     1.)    Pap done today. 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 Plan return in 6 mos for exam with NP, repeat pap in 1 year if normal today. Following this can do yearly visits as will be out 5 years in 11/2021.     2.) Genetic risk factors were assessed - patient was tested and negative for BRCA due to [employer]ken[place] [employer] heritage.    3.) Labs and/or tests ordered include: pap smear     4.) Health maintenance issues addressed today include: patient to have MMG in Jan 2021-already scheduled.    5.) Right axillary sebaceous cyst-recommend heat to area.           Thank you for allowing us to participate in the care of your patient.         Sincerely,    Kenna Roblero MD    Department of Ob/Gyn and Women's Health  Division of Gynecologic Oncology  [facility]  [phone]          CC  No care team member to display  SELF, REFERRED        Again, thank you for allowing me to participate in the care of your patient.        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

## 2023-12-12 ENCOUNTER — PATIENT OUTREACH (OUTPATIENT)
Dept: OBGYN | Facility: CLINIC | Age: 48
End: 2023-12-12
Payer: COMMERCIAL

## 2023-12-12 DIAGNOSIS — C53.9 ADENOCARCINOMA OF CERVIX (H): Primary | ICD-10-CM

## 2024-01-08 PROBLEM — C53.9 ADENOCARCINOMA OF CERVIX (H): Status: ACTIVE | Noted: 2021-02-15

## 2024-07-14 ENCOUNTER — HEALTH MAINTENANCE LETTER (OUTPATIENT)
Age: 49
End: 2024-07-14

## 2025-02-19 ENCOUNTER — OFFICE VISIT (OUTPATIENT)
Dept: URGENT CARE | Facility: URGENT CARE | Age: 50
End: 2025-02-19
Payer: COMMERCIAL

## 2025-02-19 VITALS
OXYGEN SATURATION: 100 % | WEIGHT: 136.4 LBS | SYSTOLIC BLOOD PRESSURE: 112 MMHG | TEMPERATURE: 100.4 F | DIASTOLIC BLOOD PRESSURE: 70 MMHG | BODY MASS INDEX: 21.36 KG/M2 | HEART RATE: 102 BPM

## 2025-02-19 DIAGNOSIS — J10.1 INFLUENZA A: Primary | ICD-10-CM

## 2025-02-19 LAB
DEPRECATED S PYO AG THROAT QL EIA: NEGATIVE
FLUAV AG SPEC QL IA: POSITIVE
FLUBV AG SPEC QL IA: NEGATIVE

## 2025-02-19 PROCEDURE — 87635 SARS-COV-2 COVID-19 AMP PRB: CPT | Performed by: PHYSICIAN ASSISTANT

## 2025-02-19 PROCEDURE — 99204 OFFICE O/P NEW MOD 45 MIN: CPT | Performed by: PHYSICIAN ASSISTANT

## 2025-02-19 PROCEDURE — 87804 INFLUENZA ASSAY W/OPTIC: CPT | Performed by: PHYSICIAN ASSISTANT

## 2025-02-19 PROCEDURE — 87651 STREP A DNA AMP PROBE: CPT | Performed by: PHYSICIAN ASSISTANT

## 2025-02-19 RX ORDER — ESTRADIOL 0.04 MG/D
1 PATCH, EXTENDED RELEASE TRANSDERMAL
COMMUNITY
Start: 2024-10-03

## 2025-02-19 RX ORDER — AZITHROMYCIN 250 MG/1
TABLET, FILM COATED ORAL
COMMUNITY
Start: 2024-08-01 | End: 2025-02-19

## 2025-02-19 RX ORDER — DEXTROAMPHETAMINE SACCHARATE, AMPHETAMINE ASPARTATE, DEXTROAMPHETAMINE SULFATE AND AMPHETAMINE SULFATE 2.5; 2.5; 2.5; 2.5 MG/1; MG/1; MG/1; MG/1
1 TABLET ORAL DAILY
COMMUNITY
Start: 2024-10-02

## 2025-02-19 RX ORDER — BENZONATATE 100 MG/1
100 CAPSULE ORAL 3 TIMES DAILY PRN
Qty: 20 CAPSULE | Refills: 0 | Status: SHIPPED | OUTPATIENT
Start: 2025-02-19

## 2025-02-19 NOTE — PROGRESS NOTES
Assessment & Plan     1. Influenza A (Primary)  Patient presents to the clinic for evaluation of fever, chills, body aches and fatigue. She is non toxic appearing.   On exam, lungs are CTAB without sign of respiratory distress. Throat without PTA or RPA and TM clear B/L. No nuchal rigidity.  Suspect that she has a viral infection, and her influenza test is positive. She is out of the window for treatment with tamiflu.   Tessalon can be used for a dry cough, if cough becomes more productive, advised guaifenesin.   Supportive cares encouraged  Low threshold for follow-up if symptoms worsening.   - Streptococcus A Rapid Screen w/Reflex to PCR - Clinic Collect  - Influenza A & B Antigen - Clinic Collect  - COVID-19 Virus (Coronavirus) by PCR Nose  - Group A Streptococcus PCR Throat Swab  - benzonatate (TESSALON) 100 MG capsule; Take 1 capsule (100 mg) by mouth 3 times daily as needed for cough.  Dispense: 20 capsule; Refill: 0        Diagnosis and treatment plan was reviewed with patient and/or family.   We went over any labs or imaging. Discussed worsening symptoms or little to no relief despite treatment plan to follow-up with PCP or return to clinic.  Patient verbalizes understanding. All questions were addressed and answered.     ROSALIA Baker [facility]    CHIEF COMPLAINT:   Chief Complaint   Patient presents with    Urgent Care     Pt presents with runny nose, congestion, cough, throat pain and body aches onset Sunday. Pt was exposed to RSV and influenza A.     Ibrahima Christy is a 49 year old female who presents to clinic today for evaluation of URI symptoms.   Symptoms started on Sunday PM, started coughing. Feeling feverish and chills. No energy or appetite.   Cough is mostly non productive.   No chest pain, she is having a little shortness of breath.     Son recently diagnosed with RSV and influenza.       Past Medical History:   Diagnosis Date    Cervical cancer (H)  2010     Past Surgical History:   Procedure Laterality Date    LAPAROSCOPIC HYSTERECTOMY  11/18/2016    has ovaries, pathology - invasive adenocarcinoma     Social History     Tobacco Use    Smoking status: Never    Smokeless tobacco: Never   Substance Use Topics    Alcohol use: Yes     Comment: social     Current Outpatient Medications   Medication Sig Dispense Refill    estradiol (VIVELLE-DOT) 0.0375 MG/24HR BIW patch Place 1 patch onto the skin.      amphetamine-dextroamphetamine (ADDERALL) 10 MG tablet Take 1 tablet by mouth daily. (Patient not taking: Reported on 2/19/2025)      azithromycin (ZITHROMAX) 250 MG tablet TAKE 2 TABLETS BY MOUTH ON DAY 1 THEN 1 TABLET BY MOUTH DAILY X 4 DAYS (Patient not taking: Reported on 2/19/2025)       No current facility-administered medications for this visit.     No Known Allergies    10 point ROS of systems were all negative except for pertinent positives noted in my HPI.      Exam:   /70   Pulse 102   Temp 100.4  F (38  C) (Tympanic)   Wt 61.9 kg (136 lb 6.4 oz)   SpO2 100%   BMI 21.36 kg/m    Constitutional: alert and no distress  Head: Normocephalic, atraumatic.  Eyes: conjunctiva clear, no drainage  ENT: TMs clear and shiny devaughn, nasal mucosa pink and moist, throat without tonsillar hypertrophy or erythema  Neck: neck is supple, no cervical lymphadenopathy or nuchal rigidity  Cardiovascular: RRR  Respiratory: CTA bilaterally, no rhonchi or rales  Skin: no rashes  Neurologic: Speech clear, gait normal. Moves all extremities.    Results for orders placed or performed in visit on 02/19/25   Streptococcus A Rapid Screen w/Reflex to PCR - Clinic Collect     Status: Normal    Specimen: Throat; Swab   Result Value Ref Range    Group A Strep antigen Negative Negative   Influenza A & B Antigen - Clinic Collect     Status: Abnormal    Specimen: Nose; Swab   Result Value Ref Range    Influenza A antigen Positive (A) Negative    Influenza B antigen Negative Negative     Narrative    Test results must be correlated with clinical data. If necessary, results should be confirmed by a molecular assay or viral culture.

## 2025-02-19 NOTE — PATIENT INSTRUCTIONS
Tessalon can be used for a dry cough, if your cough becomes more productive, you can take Mucinex (guaifenesin)  Tylenol / Ibuprofen for comfort and fever   Fluids, and rest  Humidified air at night  Honey for cough

## 2025-02-20 LAB
S PYO DNA THROAT QL NAA+PROBE: NOT DETECTED
SARS-COV-2 RNA RESP QL NAA+PROBE: NEGATIVE